# Patient Record
Sex: FEMALE | Race: WHITE | Employment: FULL TIME | URBAN - METROPOLITAN AREA
[De-identification: names, ages, dates, MRNs, and addresses within clinical notes are randomized per-mention and may not be internally consistent; named-entity substitution may affect disease eponyms.]

---

## 2018-10-22 ENCOUNTER — OFFICE VISIT (OUTPATIENT)
Dept: FAMILY MEDICINE CLINIC | Age: 43
End: 2018-10-22
Payer: COMMERCIAL

## 2018-10-22 VITALS
DIASTOLIC BLOOD PRESSURE: 98 MMHG | HEART RATE: 76 BPM | BODY MASS INDEX: 38.66 KG/M2 | SYSTOLIC BLOOD PRESSURE: 142 MMHG | WEIGHT: 261 LBS | HEIGHT: 69 IN | OXYGEN SATURATION: 98 % | TEMPERATURE: 98 F

## 2018-10-22 DIAGNOSIS — E66.01 CLASS 2 SEVERE OBESITY DUE TO EXCESS CALORIES WITH SERIOUS COMORBIDITY AND BODY MASS INDEX (BMI) OF 38.0 TO 38.9 IN ADULT (HCC): ICD-10-CM

## 2018-10-22 DIAGNOSIS — Z76.89 ENCOUNTER TO ESTABLISH CARE: Primary | ICD-10-CM

## 2018-10-22 DIAGNOSIS — Z12.4 CERVICAL CANCER SCREENING: ICD-10-CM

## 2018-10-22 DIAGNOSIS — Z91.09 ENVIRONMENTAL ALLERGIES: ICD-10-CM

## 2018-10-22 DIAGNOSIS — Z12.39 BREAST CANCER SCREENING: ICD-10-CM

## 2018-10-22 DIAGNOSIS — I10 BENIGN ESSENTIAL HTN: ICD-10-CM

## 2018-10-22 DIAGNOSIS — L30.8 OTHER ECZEMA: ICD-10-CM

## 2018-10-22 DIAGNOSIS — K21.9 GASTROESOPHAGEAL REFLUX DISEASE WITHOUT ESOPHAGITIS: ICD-10-CM

## 2018-10-22 PROBLEM — L20.84 INTRINSIC ECZEMA: Status: ACTIVE | Noted: 2018-10-22

## 2018-10-22 LAB
A/G RATIO: 1.7 (ref 1.1–2.2)
ALBUMIN SERPL-MCNC: 4.8 G/DL (ref 3.4–5)
ALP BLD-CCNC: 97 U/L (ref 40–129)
ALT SERPL-CCNC: 11 U/L (ref 10–40)
ANION GAP SERPL CALCULATED.3IONS-SCNC: 19 MMOL/L (ref 3–16)
AST SERPL-CCNC: 13 U/L (ref 15–37)
BILIRUB SERPL-MCNC: 0.4 MG/DL (ref 0–1)
BUN BLDV-MCNC: 12 MG/DL (ref 7–20)
CALCIUM SERPL-MCNC: 9.8 MG/DL (ref 8.3–10.6)
CHLORIDE BLD-SCNC: 101 MMOL/L (ref 99–110)
CHOLESTEROL, TOTAL: 171 MG/DL (ref 0–199)
CO2: 21 MMOL/L (ref 21–32)
CREAT SERPL-MCNC: 0.6 MG/DL (ref 0.6–1.1)
GFR AFRICAN AMERICAN: >60
GFR NON-AFRICAN AMERICAN: >60
GLOBULIN: 2.9 G/DL
GLUCOSE BLD-MCNC: 93 MG/DL (ref 70–99)
HDLC SERPL-MCNC: 41 MG/DL (ref 40–60)
LDL CHOLESTEROL CALCULATED: 104 MG/DL
POTASSIUM SERPL-SCNC: 4.6 MMOL/L (ref 3.5–5.1)
SODIUM BLD-SCNC: 141 MMOL/L (ref 136–145)
TOTAL PROTEIN: 7.7 G/DL (ref 6.4–8.2)
TRIGL SERPL-MCNC: 132 MG/DL (ref 0–150)
TSH REFLEX: 3.78 UIU/ML (ref 0.27–4.2)
VLDLC SERPL CALC-MCNC: 26 MG/DL

## 2018-10-22 PROCEDURE — 99204 OFFICE O/P NEW MOD 45 MIN: CPT | Performed by: FAMILY MEDICINE

## 2018-10-22 PROCEDURE — 36415 COLL VENOUS BLD VENIPUNCTURE: CPT | Performed by: FAMILY MEDICINE

## 2018-10-22 RX ORDER — METOPROLOL SUCCINATE 50 MG/1
TABLET, EXTENDED RELEASE ORAL
COMMUNITY
Start: 2018-06-20 | End: 2018-10-22 | Stop reason: SDUPTHER

## 2018-10-22 RX ORDER — MECLIZINE HYDROCHLORIDE 25 MG/1
25 TABLET ORAL
COMMUNITY
Start: 2017-08-11 | End: 2021-06-18

## 2018-10-22 RX ORDER — HYDROCHLOROTHIAZIDE 12.5 MG/1
12.5 CAPSULE, GELATIN COATED ORAL EVERY MORNING
Qty: 90 CAPSULE | Refills: 1 | Status: SHIPPED | OUTPATIENT
Start: 2018-10-22 | End: 2019-02-11 | Stop reason: SDUPTHER

## 2018-10-22 RX ORDER — METOPROLOL SUCCINATE 50 MG/1
TABLET, EXTENDED RELEASE ORAL
Qty: 90 TABLET | Refills: 1 | Status: SHIPPED | OUTPATIENT
Start: 2018-10-22 | End: 2019-02-11 | Stop reason: SDUPTHER

## 2018-10-22 RX ORDER — TRIAMCINOLONE ACETONIDE 5 MG/G
CREAM TOPICAL
COMMUNITY
Start: 2016-07-29 | End: 2018-10-22 | Stop reason: SDUPTHER

## 2018-10-22 RX ORDER — RANITIDINE 150 MG/1
150 TABLET ORAL
COMMUNITY
End: 2021-06-18

## 2018-10-22 RX ORDER — TRIAMCINOLONE ACETONIDE 5 MG/G
CREAM TOPICAL
Qty: 15 G | Refills: 2 | Status: SHIPPED | OUTPATIENT
Start: 2018-10-22 | End: 2019-09-08 | Stop reason: SDUPTHER

## 2018-10-22 RX ORDER — SUMATRIPTAN 100 MG/1
100 TABLET, FILM COATED ORAL
COMMUNITY
Start: 2017-04-06 | End: 2021-06-18

## 2018-10-22 RX ORDER — LOSARTAN POTASSIUM 50 MG/1
25 TABLET ORAL
COMMUNITY
Start: 2018-08-13 | End: 2019-02-11 | Stop reason: SDUPTHER

## 2018-10-22 RX ORDER — HYDROCHLOROTHIAZIDE 12.5 MG/1
12.5 CAPSULE, GELATIN COATED ORAL
COMMUNITY
Start: 2011-03-29 | End: 2018-10-22 | Stop reason: SDUPTHER

## 2018-10-22 RX ORDER — CETIRIZINE HYDROCHLORIDE 10 MG/1
10 TABLET ORAL
COMMUNITY

## 2018-10-22 ASSESSMENT — PATIENT HEALTH QUESTIONNAIRE - PHQ9
SUM OF ALL RESPONSES TO PHQ QUESTIONS 1-9: 0
SUM OF ALL RESPONSES TO PHQ9 QUESTIONS 1 & 2: 0
1. LITTLE INTEREST OR PLEASURE IN DOING THINGS: 0
2. FEELING DOWN, DEPRESSED OR HOPELESS: 0
SUM OF ALL RESPONSES TO PHQ QUESTIONS 1-9: 0

## 2018-10-22 ASSESSMENT — ENCOUNTER SYMPTOMS
VOMITING: 0
CONSTIPATION: 0
ABDOMINAL PAIN: 1
RHINORRHEA: 0
ROS SKIN COMMENTS: ECZEMA
BLOOD IN STOOL: 0
SHORTNESS OF BREATH: 0
BACK PAIN: 1
NAUSEA: 0
SORE THROAT: 0
DIARRHEA: 1

## 2018-10-22 NOTE — PATIENT INSTRUCTIONS
raw leafy vegetables, ½ cup of chopped or cooked vegetables, or 4 ounces (½ cup) of vegetable juice. Choose vegetables more often than vegetable juice. · Get 2 to 3 servings of low-fat and fat-free dairy each day. A serving is 8 ounces of milk, 1 cup of yogurt, or 1 ½ ounces of cheese. · Eat 6 to 8 servings of grains each day. A serving is 1 slice of bread, 1 ounce of dry cereal, or ½ cup of cooked rice, pasta, or cooked cereal. Try to choose whole-grain products as much as possible. · Limit lean meat, poultry, and fish to 2 servings each day. A serving is 3 ounces, about the size of a deck of cards. · Eat 4 to 5 servings of nuts, seeds, and legumes (cooked dried beans, lentils, and split peas) each week. A serving is 1/3 cup of nuts, 2 tablespoons of seeds, or ½ cup of cooked beans or peas. · Limit fats and oils to 2 to 3 servings each day. A serving is 1 teaspoon of vegetable oil or 2 tablespoons of salad dressing. · Limit sweets and added sugars to 5 servings or less a week. A serving is 1 tablespoon jelly or jam, ½ cup sorbet, or 1 cup of lemonade. · Eat less than 2,300 milligrams (mg) of sodium a day. If you limit your sodium to 1,500 mg a day, you can lower your blood pressure even more. Tips for success  · Start small. Do not try to make dramatic changes to your diet all at once. You might feel that you are missing out on your favorite foods and then be more likely to not follow the plan. Make small changes, and stick with them. Once those changes become habit, add a few more changes. · Try some of the following:  ¨ Make it a goal to eat a fruit or vegetable at every meal and at snacks. This will make it easy to get the recommended amount of fruits and vegetables each day. ¨ Try yogurt topped with fruit and nuts for a snack or healthy dessert. ¨ Add lettuce, tomato, cucumber, and onion to sandwiches. ¨ Combine a ready-made pizza crust with low-fat mozzarella cheese and lots of vegetable toppings. Try using tomatoes, squash, spinach, broccoli, carrots, cauliflower, and onions. ¨ Have a variety of cut-up vegetables with a low-fat dip as an appetizer instead of chips and dip. ¨ Sprinkle sunflower seeds or chopped almonds over salads. Or try adding chopped walnuts or almonds to cooked vegetables. ¨ Try some vegetarian meals using beans and peas. Add garbanzo or kidney beans to salads. Make burritos and tacos with mashed zhao beans or black beans. Where can you learn more? Go to https://ClaraStreampepiceweb.FitWithMe. org and sign in to your [employer] account. Enter W449 in the AdMobius box to learn more about \"DASH Diet: Care Instructions. \"     If you do not have an account, please click on the \"Sign Up Now\" link. Current as of: December 6, 2017  Content Version: 11.7  © 7152-2679 [email or web address]. Care instructions adapted under license by [facility] If you have questions about a medical condition or this instruction, always ask your healthcare professional. [place] [address] any warranty or liability for your use of this information. Patient Education        Starting a Weight Loss Plan: Care Instructions  Your Care Instructions    If you are thinking about losing weight, it can be hard to know where to start. Your doctor can help you set up a weight loss plan that best meets your needs. You may want to take a class on nutrition or exercise, or join a weight loss support group. If you have questions about how to make changes to your eating or exercise habits, ask your doctor about seeing a registered dietitian or an exercise specialist.  It can be a big challenge to lose weight. But you do not have to make huge changes at once. Make small changes, and stick with them. When those changes become habit, add a few more changes. If you do not think you are ready to make changes right now, try to pick a date in the future.  Make an appointment to see your doctor to

## 2018-10-23 LAB
ESTIMATED AVERAGE GLUCOSE: 105.4 MG/DL
HBA1C MFR BLD: 5.3 %

## 2018-11-02 ENCOUNTER — PATIENT MESSAGE (OUTPATIENT)
Dept: FAMILY MEDICINE CLINIC | Age: 43
End: 2018-11-02

## 2018-11-02 DIAGNOSIS — R35.0 URINARY FREQUENCY: Primary | ICD-10-CM

## 2018-11-02 DIAGNOSIS — R39.15 URINARY URGENCY: ICD-10-CM

## 2018-11-02 DIAGNOSIS — R30.0 BURNING WITH URINATION: ICD-10-CM

## 2018-11-02 RX ORDER — NITROFURANTOIN 25; 75 MG/1; MG/1
100 CAPSULE ORAL 2 TIMES DAILY
Qty: 10 CAPSULE | Refills: 0 | Status: SHIPPED | OUTPATIENT
Start: 2018-11-02 | End: 2018-11-07

## 2018-11-02 RX ORDER — PHENAZOPYRIDINE HYDROCHLORIDE 200 MG/1
200 TABLET, FILM COATED ORAL 3 TIMES DAILY PRN
Qty: 6 TABLET | Refills: 0 | Status: SHIPPED | OUTPATIENT
Start: 2018-11-02 | End: 2018-11-04

## 2018-11-12 ENCOUNTER — OFFICE VISIT (OUTPATIENT)
Dept: OBGYN CLINIC | Age: 43
End: 2018-11-12
Payer: COMMERCIAL

## 2018-11-12 ENCOUNTER — HOSPITAL ENCOUNTER (OUTPATIENT)
Dept: WOMENS IMAGING | Age: 43
Discharge: HOME | End: 2018-11-12
Payer: COMMERCIAL

## 2018-11-12 VITALS
BODY MASS INDEX: 38.95 KG/M2 | DIASTOLIC BLOOD PRESSURE: 82 MMHG | TEMPERATURE: 97.9 F | HEART RATE: 86 BPM | HEIGHT: 69 IN | SYSTOLIC BLOOD PRESSURE: 128 MMHG | WEIGHT: 263 LBS

## 2018-11-12 DIAGNOSIS — L70.9 ACNE, UNSPECIFIED ACNE TYPE: ICD-10-CM

## 2018-11-12 DIAGNOSIS — Z12.4 PAP SMEAR FOR CERVICAL CANCER SCREENING: ICD-10-CM

## 2018-11-12 DIAGNOSIS — Z12.39 BREAST CANCER SCREENING: ICD-10-CM

## 2018-11-12 DIAGNOSIS — N92.0 MENORRHAGIA WITH REGULAR CYCLE: ICD-10-CM

## 2018-11-12 DIAGNOSIS — Z01.411 ENCNTR FOR GYN EXAM (GENERAL) (ROUTINE) W ABNORMAL FINDINGS: Primary | ICD-10-CM

## 2018-11-12 DIAGNOSIS — R87.610 ASCUS OF CERVIX WITH NEGATIVE HIGH RISK HPV: ICD-10-CM

## 2018-11-12 DIAGNOSIS — I10 HYPERTENSION, UNSPECIFIED TYPE: ICD-10-CM

## 2018-11-12 DIAGNOSIS — R33.9 URINARY RETENTION: ICD-10-CM

## 2018-11-12 LAB
BACTERIA: ABNORMAL /HPF
BASOPHILS ABSOLUTE: 0 K/UL (ref 0–0.2)
BASOPHILS RELATIVE PERCENT: 0.4 %
BILIRUBIN URINE: NEGATIVE
BLOOD, URINE: NEGATIVE
CLARITY: CLEAR
COLOR: YELLOW
EOSINOPHILS ABSOLUTE: 0.1 K/UL (ref 0–0.6)
EOSINOPHILS RELATIVE PERCENT: 0.6 %
EPITHELIAL CELLS, UA: 4 /HPF (ref 0–5)
GLUCOSE URINE: NEGATIVE MG/DL
HCT VFR BLD CALC: 39.6 % (ref 36–48)
HEMOGLOBIN: 13.3 G/DL (ref 12–16)
KETONES, URINE: NEGATIVE MG/DL
LEUKOCYTE ESTERASE, URINE: NEGATIVE
LYMPHOCYTES ABSOLUTE: 1.6 K/UL (ref 1–5.1)
LYMPHOCYTES RELATIVE PERCENT: 15.4 %
MCH RBC QN AUTO: 28.8 PG (ref 26–34)
MCHC RBC AUTO-ENTMCNC: 33.5 G/DL (ref 31–36)
MCV RBC AUTO: 86 FL (ref 80–100)
MICROSCOPIC EXAMINATION: YES
MONOCYTES ABSOLUTE: 0.4 K/UL (ref 0–1.3)
MONOCYTES RELATIVE PERCENT: 3.9 %
MUCUS: ABNORMAL /LPF
NEUTROPHILS ABSOLUTE: 8.1 K/UL (ref 1.7–7.7)
NEUTROPHILS RELATIVE PERCENT: 79.7 %
NITRITE, URINE: NEGATIVE
PDW BLD-RTO: 14.1 % (ref 12.4–15.4)
PH UA: 6
PLATELET # BLD: 256 K/UL (ref 135–450)
PMV BLD AUTO: 8 FL (ref 5–10.5)
PROTEIN UA: ABNORMAL MG/DL
RBC # BLD: 4.61 M/UL (ref 4–5.2)
RBC UA: 1 /HPF (ref 0–4)
SPECIFIC GRAVITY UA: >=1.03
URINE TYPE: ABNORMAL
UROBILINOGEN, URINE: 0.2 E.U./DL
WBC # BLD: 10.2 K/UL (ref 4–11)
WBC UA: 2 /HPF (ref 0–5)

## 2018-11-12 PROCEDURE — 81003 URINALYSIS AUTO W/O SCOPE: CPT | Performed by: OBSTETRICS & GYNECOLOGY

## 2018-11-12 PROCEDURE — 36415 COLL VENOUS BLD VENIPUNCTURE: CPT | Performed by: OBSTETRICS & GYNECOLOGY

## 2018-11-12 PROCEDURE — 77067 SCR MAMMO BI INCL CAD: CPT

## 2018-11-12 PROCEDURE — 99386 PREV VISIT NEW AGE 40-64: CPT | Performed by: OBSTETRICS & GYNECOLOGY

## 2018-11-12 RX ORDER — NITROFURANTOIN 25; 75 MG/1; MG/1
100 CAPSULE ORAL 2 TIMES DAILY
COMMUNITY
End: 2019-12-23

## 2018-11-12 ASSESSMENT — ENCOUNTER SYMPTOMS
CONSTIPATION: 0
EYE ITCHING: 0
COUGH: 0
BACK PAIN: 0
DIARRHEA: 0
SHORTNESS OF BREATH: 0
SINUS PRESSURE: 0
VOMITING: 0
EYE PAIN: 0
EYE REDNESS: 0
NAUSEA: 0
ABDOMINAL PAIN: 0
SORE THROAT: 0

## 2018-11-12 NOTE — PROGRESS NOTES
Annual Exam      CC:   Chief Complaint   Patient presents with    Gynecologic Exam       HPI:  44 y.o. Becky Canas presents for her gynecologic annual exam.      Patient seen and examined. Patient reports she is concerned about her periods. States she has always had heavy periods. States they improved after the birth of her child for a few years, however now they are very heavy again. States she will bleed through her clothing at work and on some days will soak through Nimbuzz" pads in 40 minutes. Reports she has tried  Depo, OCPs and Mirena in the past.  She was unable to tolerate systemic hormones secondary to the side effects and She had 2 Mirenas placed that both spontaneously expelled. Reports periods last 28-30 days, she will bleed for 7 days and days 2-4 are the heaviest.  Reports sometimes painful and will occasionally pass large clots. Health Maintenance:  Birth control: None  Pregnancy plans: None  Safe relationship: Yes -  to Martin Steele for 18 years    Screening:  Last pap smear: in 2013 - Was ASCUS HPV - at that time  History of abnormal pap smears: Yes - at age 24 she had an HPV+ pap with LEEP - normal pap smears since that time. STI screening: Denies recent  Mammogram: 11/12/2018  Colonoscopy: 2000 - Normal    Vaccines:  Flu vaccine: Yes      Review of Systems:   Review of Systems   Constitutional: Negative for chills and fever. HENT: Negative for congestion, sinus pressure, sneezing and sore throat. Eyes: Negative for pain, redness and itching. Respiratory: Negative for cough and shortness of breath. Cardiovascular: Positive for palpitations (PVCs - has been evaluted by cardiology - doing better with metoprolol). Negative for chest pain. Gastrointestinal: Negative for abdominal pain, constipation, diarrhea, nausea and vomiting. Genitourinary: Positive for menstrual problem and vaginal bleeding. Negative for dysuria, frequency, pelvic pain and vaginal discharge.    Musculoskeletal: Arm, Position: Sitting, Cuff Size: Large Adult)   Pulse 86   Temp 97.9 °F (36.6 °C) (Oral)   Ht 5' 9\" (1.753 m)   Wt 263 lb (119.3 kg)   LMP 10/20/2018   BMI 38.84 kg/m²     Exam:   Physical Exam   Constitutional: She is oriented to person, place, and time. She appears well-developed and well-nourished. HENT:   Head: Normocephalic and atraumatic. Mouth/Throat: Oropharynx is clear and moist.   Eyes: EOM are normal.   Neck: Normal range of motion. Neck supple. No thyromegaly present. Cardiovascular: Normal rate, regular rhythm and normal heart sounds. Exam reveals no gallop and no friction rub. No murmur heard. Pulmonary/Chest: Effort normal. No respiratory distress. She has no wheezes. Right breast exhibits no mass, no nipple discharge, no skin change and no tenderness. Left breast exhibits no mass, no nipple discharge, no skin change and no tenderness. Abdominal: Soft. She exhibits no distension and no mass. There is no tenderness. There is no rebound and no guarding. Genitourinary: There is no tenderness or lesion on the right labia. There is no tenderness or lesion on the left labia. Uterus is not deviated, not enlarged, not fixed and not tender. Cervix exhibits no motion tenderness, no discharge and no friability. Right adnexum displays no mass, no tenderness and no fullness. Left adnexum displays no mass, no tenderness and no fullness. No erythema or tenderness in the vagina. No vaginal discharge found. Genitourinary Comments: Exam limited secondary to body habitus and retroverted uterus   Musculoskeletal: She exhibits no edema. Neurological: She is alert and oriented to person, place, and time. Skin: Skin is warm and dry. Psychiatric: She has a normal mood and affect. Her behavior is normal.   Vitals reviewed. Assessment/Plan:  44 y.o. Irineo Rene presenting for her annual exam:    1.  Encntr for gyn exam (general) (routine) w abnormal findings     - PAP SMEAR collected today, will

## 2018-11-13 LAB
FOLLICLE STIMULATING HORMONE: 2.4 MIU/ML
LUTEINIZING HORMONE: 6 MIU/ML

## 2018-11-14 PROBLEM — I10 HYPERTENSION: Status: ACTIVE | Noted: 2018-11-14

## 2018-11-14 LAB
HPV COMMENT: NORMAL
HPV TYPE 16: NOT DETECTED
HPV TYPE 18: NOT DETECTED
HPVOH (OTHER TYPES): NOT DETECTED
SEX HORMONE BINDING GLOBULIN: 21 NMOL/L (ref 30–135)
TESTOSTERONE FREE-NONMALE: 4.5 PG/ML (ref 1.1–5.8)
TESTOSTERONE TOTAL: 20 NG/DL (ref 20–70)

## 2018-11-26 ENCOUNTER — OFFICE VISIT (OUTPATIENT)
Dept: OBGYN CLINIC | Age: 43
End: 2018-11-26
Payer: COMMERCIAL

## 2018-11-26 VITALS
SYSTOLIC BLOOD PRESSURE: 128 MMHG | DIASTOLIC BLOOD PRESSURE: 80 MMHG | BODY MASS INDEX: 38.9 KG/M2 | HEART RATE: 107 BPM | WEIGHT: 263.4 LBS | TEMPERATURE: 98.1 F

## 2018-11-26 DIAGNOSIS — N92.0 MENORRHAGIA WITH REGULAR CYCLE: Primary | ICD-10-CM

## 2018-11-26 DIAGNOSIS — Z30.09 FAMILY PLANNING EDUCATION, GUIDANCE, AND COUNSELING: ICD-10-CM

## 2018-11-26 DIAGNOSIS — I10 HYPERTENSION, UNSPECIFIED TYPE: ICD-10-CM

## 2018-11-26 PROCEDURE — 76856 US EXAM PELVIC COMPLETE: CPT | Performed by: OBSTETRICS & GYNECOLOGY

## 2018-11-26 PROCEDURE — 99213 OFFICE O/P EST LOW 20 MIN: CPT | Performed by: OBSTETRICS & GYNECOLOGY

## 2018-11-26 NOTE — LETTER
 ranitidine (ZANTAC) 150 MG tablet Take 150 mg by mouth      SUMAtriptan (IMITREX) 100 MG tablet Take 100 mg by mouth      metoprolol succinate (TOPROL XL) 50 MG extended release tablet TAKE ONE TABLET BY MOUTH DAILY 90 tablet 1    hydrochlorothiazide (MICROZIDE) 12.5 MG capsule Take 1 capsule by mouth every morning 90 capsule 1    triamcinolone (ARISTOCORT) 0.5 % cream Apply 2 to 4 times daily to affected areas 15 g 2     No current facility-administered medications for this visit. IN ACCORDANCE WITH OUR FORMULARY SYSTEM, A GENERIC EQUIVALENT DRUG MAY BE DISPENSED AND ADMINISTERED UNLESS D. A. W. IS WRITTEN WITH THE MEDICATION ORDER  PRE-SURGICAL PHYSICIAN ORDERS:  GYNECOLOGY SURGERY    Patient Name __Montrell Douglas_____    _1975__    Surgical Procedure___Endometrial Ablation, NovaSure_________________      Date of Surgery___________________             ? Admit as Inpatient    ? Outpatient    Height__5'9__ Weight_263 lb __Allergies_Latex, Avocado, Banana, Thimerosal___    Pre-surgery Testing Orders:  ? Pre-surgical Anesthesia Orders Per Anesthesiologist ? Type & Screen ? RH ABO ? CBC ? Chem 7   ? Serum HCG quantitative ? Serum HCG qualitative ? CXR _____ Reason ? EKG _____reason                           ? Urine Pregnancy on Day of Surgery for all local anesthesia patients ? Other:     Preop Antibiotic Prophylaxis/Hysterectomy/Lap assisted Hysterectomy/Vaginal Hysterectomy-Day of Surgery     Cefazolin IVPB per weight base protocol  ? Cefazolin 2 grams if <119.9kg  ?  Cefazolin 3 grams if ?120kg (?264 lbs.)    ALTERNATIVE:     (Consider for PCN/Cephalosporin allergic pts)                                                                                                                      Metronidazole 500 mg IVPB x 1 and Levofloxacin 500 mg IVPB x1

## 2018-11-26 NOTE — PROGRESS NOTES
mm.   The myometrium is heterogeneous in appearance. The right ovary is present and normal.  The right ovary measures 3.43cm x 1.48cm x 1.97cm. Ovary/adnexal findings:  no masses seen. The right adnexa is normal.  Doppler interrogation demonstrates normal blood flow to the right ovary. The left ovary is present and normal.  The left ovary measures 3.07cm x 1.83cm x 2.14cm. Ovary/adnexal findings:  no masses seen. The left adnexa is normal.  Doppler interrogation demonstrates normal blood flow to the left ovary. IMPRESSION:  Heterogeneous appearing uterus. Normal appearing right and left ovary. Normal blood flow seen to right and left ovary. Imaging is limited secondary to bowel gas. The patient is well aware of the limitations of ultrasound in the detection of anomalies of the abdomen and pelvis. Assessment/Plan:     Sangita Rodriguez is a 44 y.o. female who presents for ultrasound and follow-up for menorrhagia    1. Menorrhagia with regular cycle     - Ultrasound with no acute findings today    - Discussed heavy menses and management options including OCPs, progesterone only methods, IUD, and surgical intervention including endometrial ablation and hysterectomy. Patient does not tolerate the side effects of systemic hormones and has spontaneously expelled IUDs x2. Patient is interested in endometrial ablation as next step in management prior to considering a hysterectomy. Discussed need for reliable contraception and the patient is interested in [address] [place] [address]. Will refer to Dr. Marvin Maravilla for tubal ligation and proceed with prior authorization for endometrial ablation.      - Discussed need for endometrial biopsy prior to endometrial ablation and patient states she is not interested in having that performed today - will have back for EMB prior to ablation. 2. Hypertension, unspecified type    3.  Family planning education, guidance, and counseling     - External Referral To Gynecology for consultation regarding tubal ligation    Breanna Price,

## 2019-01-08 ENCOUNTER — TELEPHONE (OUTPATIENT)
Dept: OBGYN CLINIC | Age: 44
End: 2019-01-08

## 2019-01-08 NOTE — TELEPHONE ENCOUNTER
LM for patient to recall office in regards to surgery scheduling. Surgery packet started.      Patient to be scheduled after Tubal with Dr. Iram Mcconnell

## 2019-02-11 ENCOUNTER — PATIENT MESSAGE (OUTPATIENT)
Dept: FAMILY MEDICINE CLINIC | Age: 44
End: 2019-02-11

## 2019-02-11 DIAGNOSIS — I10 BENIGN ESSENTIAL HTN: ICD-10-CM

## 2019-02-11 RX ORDER — HYDROCHLOROTHIAZIDE 12.5 MG/1
12.5 CAPSULE, GELATIN COATED ORAL EVERY MORNING
Qty: 90 CAPSULE | Refills: 1 | Status: SHIPPED | OUTPATIENT
Start: 2019-02-11 | End: 2019-03-26 | Stop reason: SDUPTHER

## 2019-02-11 RX ORDER — METOPROLOL SUCCINATE 50 MG/1
TABLET, EXTENDED RELEASE ORAL
Qty: 90 TABLET | Refills: 1 | Status: SHIPPED | OUTPATIENT
Start: 2019-02-11 | End: 2019-03-26 | Stop reason: SDUPTHER

## 2019-02-11 RX ORDER — LOSARTAN POTASSIUM 50 MG/1
50 TABLET ORAL DAILY
Qty: 90 TABLET | Refills: 1 | Status: SHIPPED | OUTPATIENT
Start: 2019-02-11 | End: 2019-03-26 | Stop reason: SDUPTHER

## 2019-03-18 NOTE — TELEPHONE ENCOUNTER
Left message for patient to recall the office. Need to see if she has scheduled with Dr Sussy Santos for tubal ligation? Is patient still interested in ablation?

## 2019-03-21 ENCOUNTER — TELEPHONE (OUTPATIENT)
Dept: FAMILY MEDICINE CLINIC | Age: 44
End: 2019-03-21

## 2019-03-21 NOTE — TELEPHONE ENCOUNTER
Patient requires these medications to be filled 90 day supply at [address] [place] [address] Road [phone] - f [phone]    losartan (COZAAR) 50 MG tablet - Take 1 tablet by mouth daily  metoprolol succinate (TOPROL XL) 50 MG extended release tablet - TAKE ONE TABLET BY MOUTH DAILY  hydrochlorothiazide (MICROZIDE) 12.5 MG capsule - Take 1 capsule by mouth every morning

## 2019-03-26 DIAGNOSIS — I10 BENIGN ESSENTIAL HTN: ICD-10-CM

## 2019-03-26 DIAGNOSIS — I10 HYPERTENSION, UNSPECIFIED TYPE: Primary | ICD-10-CM

## 2019-03-26 RX ORDER — METOPROLOL SUCCINATE 50 MG/1
TABLET, EXTENDED RELEASE ORAL
Qty: 90 TABLET | Refills: 0 | Status: SHIPPED | OUTPATIENT
Start: 2019-03-26 | End: 2019-07-13 | Stop reason: SDUPTHER

## 2019-03-26 RX ORDER — HYDROCHLOROTHIAZIDE 12.5 MG/1
12.5 CAPSULE, GELATIN COATED ORAL EVERY MORNING
Qty: 90 CAPSULE | Refills: 0 | Status: SHIPPED | OUTPATIENT
Start: 2019-03-26 | End: 2019-07-13 | Stop reason: SDUPTHER

## 2019-03-26 RX ORDER — LOSARTAN POTASSIUM 50 MG/1
50 TABLET ORAL DAILY
Qty: 90 TABLET | Refills: 0 | Status: SHIPPED | OUTPATIENT
Start: 2019-03-26 | End: 2019-10-19 | Stop reason: SDUPTHER

## 2019-03-29 NOTE — TELEPHONE ENCOUNTER
Patient returned office call, she states that she is not interested in moving forward with surgery at all at this time. (tubal or ablation)     Patient states that this is due to insurance coverage and cost of procedures.

## 2019-04-01 NOTE — TELEPHONE ENCOUNTER
Please offer patient appointment if she would like to proceed with any other methods to control bleeding, IUD, OCPs, etc.  Thank you.

## 2019-05-17 ENCOUNTER — EMPLOYEE WELLNESS (OUTPATIENT)
Dept: OTHER | Age: 44
End: 2019-05-17

## 2019-05-17 LAB
CHOLESTEROL, TOTAL: 176 MG/DL (ref 0–199)
GLUCOSE BLD-MCNC: 100 MG/DL (ref 70–99)
HDLC SERPL-MCNC: 37 MG/DL (ref 40–60)
LDL CHOLESTEROL CALCULATED: 93 MG/DL
TRIGL SERPL-MCNC: 231 MG/DL (ref 0–150)

## 2019-05-28 VITALS — BODY MASS INDEX: 38.84 KG/M2 | WEIGHT: 263 LBS

## 2019-06-07 ENCOUNTER — OFFICE VISIT (OUTPATIENT)
Dept: FAMILY MEDICINE CLINIC | Age: 44
End: 2019-06-07
Payer: COMMERCIAL

## 2019-06-07 VITALS
DIASTOLIC BLOOD PRESSURE: 88 MMHG | WEIGHT: 263 LBS | SYSTOLIC BLOOD PRESSURE: 148 MMHG | BODY MASS INDEX: 38.95 KG/M2 | HEIGHT: 69 IN | TEMPERATURE: 97.9 F | OXYGEN SATURATION: 99 % | HEART RATE: 96 BPM

## 2019-06-07 DIAGNOSIS — R73.9 HYPERGLYCEMIA: Primary | ICD-10-CM

## 2019-06-07 DIAGNOSIS — I10 ELEVATED BLOOD PRESSURE READING IN OFFICE WITH DIAGNOSIS OF HYPERTENSION: ICD-10-CM

## 2019-06-07 DIAGNOSIS — Z13.1 DIABETES MELLITUS SCREENING: ICD-10-CM

## 2019-06-07 DIAGNOSIS — Z13.220 SCREENING CHOLESTEROL LEVEL: ICD-10-CM

## 2019-06-07 DIAGNOSIS — I10 HYPERTENSION, UNSPECIFIED TYPE: ICD-10-CM

## 2019-06-07 PROCEDURE — 99213 OFFICE O/P EST LOW 20 MIN: CPT | Performed by: FAMILY MEDICINE

## 2019-06-07 SDOH — HEALTH STABILITY: PHYSICAL HEALTH: ON AVERAGE, HOW MANY DAYS PER WEEK DO YOU ENGAGE IN MODERATE TO STRENUOUS EXERCISE (LIKE A BRISK WALK)?: 2 DAYS

## 2019-06-07 SDOH — ECONOMIC STABILITY: FOOD INSECURITY: WITHIN THE PAST 12 MONTHS, YOU WORRIED THAT YOUR FOOD WOULD RUN OUT BEFORE YOU GOT MONEY TO BUY MORE.: NEVER TRUE

## 2019-06-07 SDOH — HEALTH STABILITY: PHYSICAL HEALTH: ON AVERAGE, HOW MANY MINUTES DO YOU ENGAGE IN EXERCISE AT THIS LEVEL?: 40 MIN

## 2019-06-07 SDOH — SOCIAL STABILITY: SOCIAL INSECURITY
WITHIN THE LAST YEAR, HAVE YOU BEEN KICKED, HIT, SLAPPED, OR OTHERWISE PHYSICALLY HURT BY YOUR PARTNER OR EX-PARTNER?: NO

## 2019-06-07 SDOH — SOCIAL STABILITY: SOCIAL INSECURITY: WITHIN THE LAST YEAR, HAVE YOU BEEN HUMILIATED OR EMOTIONALLY ABUSED IN OTHER WAYS BY YOUR PARTNER OR EX-PARTNER?: NO

## 2019-06-07 SDOH — SOCIAL STABILITY: SOCIAL INSECURITY: WITHIN THE LAST YEAR, HAVE YOU BEEN AFRAID OF YOUR PARTNER OR EX-PARTNER?: NO

## 2019-06-07 SDOH — SOCIAL STABILITY: SOCIAL NETWORK: HOW OFTEN DO YOU ATTENT MEETINGS OF THE CLUB OR ORGANIZATION YOU BELONG TO?: NEVER

## 2019-06-07 SDOH — SOCIAL STABILITY: SOCIAL NETWORK: HOW OFTEN DO YOU GET TOGETHER WITH FRIENDS OR RELATIVES?: ONCE A WEEK

## 2019-06-07 SDOH — SOCIAL STABILITY: SOCIAL INSECURITY
WITHIN THE LAST YEAR, HAVE TO BEEN RAPED OR FORCED TO HAVE ANY KIND OF SEXUAL ACTIVITY BY YOUR PARTNER OR EX-PARTNER?: NO

## 2019-06-07 SDOH — HEALTH STABILITY: MENTAL HEALTH
STRESS IS WHEN SOMEONE FEELS TENSE, NERVOUS, ANXIOUS, OR CAN'T SLEEP AT NIGHT BECAUSE THEIR MIND IS TROUBLED. HOW STRESSED ARE YOU?: NOT AT ALL

## 2019-06-07 SDOH — ECONOMIC STABILITY: INCOME INSECURITY: HOW HARD IS IT FOR YOU TO PAY FOR THE VERY BASICS LIKE FOOD, HOUSING, MEDICAL CARE, AND HEATING?: NOT HARD AT ALL

## 2019-06-07 SDOH — SOCIAL STABILITY: SOCIAL NETWORK: HOW OFTEN DO YOU ATTEND CHURCH OR RELIGIOUS SERVICES?: NEVER

## 2019-06-07 SDOH — SOCIAL STABILITY: SOCIAL NETWORK: IN A TYPICAL WEEK, HOW MANY TIMES DO YOU TALK ON THE PHONE WITH FAMILY, FRIENDS, OR NEIGHBORS?: ONCE A WEEK

## 2019-06-07 SDOH — ECONOMIC STABILITY: TRANSPORTATION INSECURITY
IN THE PAST 12 MONTHS, HAS LACK OF TRANSPORTATION KEPT YOU FROM MEETINGS, WORK, OR FROM GETTING THINGS NEEDED FOR DAILY LIVING?: NO

## 2019-06-07 SDOH — SOCIAL STABILITY: SOCIAL NETWORK: ARE YOU MARRIED, WIDOWED, DIVORCED, SEPARATED, NEVER MARRIED, OR LIVING WITH A PARTNER?: MARRIED

## 2019-06-07 SDOH — ECONOMIC STABILITY: FOOD INSECURITY: WITHIN THE PAST 12 MONTHS, THE FOOD YOU BOUGHT JUST DIDN'T LAST AND YOU DIDN'T HAVE MONEY TO GET MORE.: NEVER TRUE

## 2019-06-07 SDOH — ECONOMIC STABILITY: TRANSPORTATION INSECURITY
IN THE PAST 12 MONTHS, HAS THE LACK OF TRANSPORTATION KEPT YOU FROM MEDICAL APPOINTMENTS OR FROM GETTING MEDICATIONS?: NO

## 2019-06-07 SDOH — SOCIAL STABILITY: SOCIAL NETWORK
DO YOU BELONG TO ANY CLUBS OR ORGANIZATIONS SUCH AS CHURCH GROUPS UNIONS, FRATERNAL OR ATHLETIC GROUPS, OR SCHOOL GROUPS?: NO

## 2019-06-07 ASSESSMENT — ENCOUNTER SYMPTOMS
CONSTIPATION: 0
VOMITING: 0
SHORTNESS OF BREATH: 0
RHINORRHEA: 0
NAUSEA: 0
SORE THROAT: 0
BLOOD IN STOOL: 0
DIARRHEA: 1
ABDOMINAL PAIN: 0

## 2019-06-07 NOTE — PROGRESS NOTES
current or ex partner: No     Emotionally abused: No     Physically abused: No     Forced sexual activity: No   Other Topics Concern    Not on file   Social History Narrative     to Dustin Fan    15 y/o daughter Lauren Son     Immunization History   Administered Date(s) Administered    Influenza Virus Vaccine 10/15/2018     Past medical, surgical, and social history reviewed and updated. Medications, immunizations, and allergies reviewed and updated     Review of Systems   Constitutional: Negative for activity change, appetite change, chills, fever and unexpected weight change. HENT: Negative for congestion, hearing loss, rhinorrhea and sore throat. Eyes: Negative for visual disturbance (has glasses for distance). Respiratory: Negative for shortness of breath. Cardiovascular: Positive for palpitations and leg swelling (depends on activity and diet, travel). Negative for chest pain. Gastrointestinal: Positive for diarrhea. Negative for abdominal pain, blood in stool, constipation, nausea and vomiting. Endocrine: Negative for polyuria. Genitourinary: Negative for dysuria and hematuria. Musculoskeletal: Positive for arthralgias and joint swelling. Skin: Negative for rash. Allergic/Immunologic: Positive for food allergies. Negative for environmental allergies. Neurological: Positive for numbness (tingles with EDS) and headaches (not as often). Negative for weakness. Hematological: Negative for adenopathy. Does not bruise/bleed easily. Psychiatric/Behavioral: Negative for dysphoric mood and sleep disturbance. The patient is not nervous/anxious.       OBJECTIVE:  Vitals:    06/07/19 1438 06/07/19 1503   BP: (!) 148/90 (!) 148/88   Site: Left Upper Arm Left Upper Arm   Position: Sitting Sitting   Cuff Size: Medium Adult Medium Adult   Pulse: 74 96   Temp: 97.9 °F (36.6 °C)    SpO2: 99%    Weight: 263 lb (119.3 kg)    Height: 5' 9\" (1.753 m)      Physical Exam   Constitutional: She is oriented to (cannot tolerate 50 mg makes her woozy)  -ambulatory BP monitoring, DASH diet, weight loss, follow up with [place] in 3 months  5. Screening cholesterol level  Triglycerides 231  Not fasting at be well within exam, repeat today  - Lipid Panel; Future    Reviewed treatment plan with patient. Patient verbalized understanding to treatment plan and questions were answered. Return in about 3 months (around 9/7/2019) for blood pressure check with [facility] if not improved. Pedro Alexis.  Alfred Haq.      6/7/2019

## 2019-06-07 NOTE — PATIENT INSTRUCTIONS
juice.  · Get 2 to 3 servings of low-fat and fat-free dairy each day. A serving is 8 ounces of milk, 1 cup of yogurt, or 1 ½ ounces of cheese. · Eat 6 to 8 servings of grains each day. A serving is 1 slice of bread, 1 ounce of dry cereal, or ½ cup of cooked rice, pasta, or cooked cereal. Try to choose whole-grain products as much as possible. · Limit lean meat, poultry, and fish to 2 servings each day. A serving is 3 ounces, about the size of a deck of cards. · Eat 4 to 5 servings of nuts, seeds, and legumes (cooked dried beans, lentils, and split peas) each week. A serving is 1/3 cup of nuts, 2 tablespoons of seeds, or ½ cup of cooked beans or peas. · Limit fats and oils to 2 to 3 servings each day. A serving is 1 teaspoon of vegetable oil or 2 tablespoons of salad dressing. · Limit sweets and added sugars to 5 servings or less a week. A serving is 1 tablespoon jelly or jam, ½ cup sorbet, or 1 cup of lemonade. · Eat less than 2,300 milligrams (mg) of sodium a day. If you limit your sodium to 1,500 mg a day, you can lower your blood pressure even more. Tips for success  · Start small. Do not try to make dramatic changes to your diet all at once. You might feel that you are missing out on your favorite foods and then be more likely to not follow the plan. Make small changes, and stick with them. Once those changes become habit, add a few more changes. · Try some of the following:  ? Make it a goal to eat a fruit or vegetable at every meal and at snacks. This will make it easy to get the recommended amount of fruits and vegetables each day. ? Try yogurt topped with fruit and nuts for a snack or healthy dessert. ? Add lettuce, tomato, cucumber, and onion to sandwiches. ? Combine a ready-made pizza crust with low-fat mozzarella cheese and lots of vegetable toppings. Try using tomatoes, squash, spinach, broccoli, carrots, cauliflower, and onions. ?  Have a variety of cut-up vegetables with a low-fat dip as an appetizer instead of chips and dip. ? Sprinkle sunflower seeds or chopped almonds over salads. Or try adding chopped walnuts or almonds to cooked vegetables. ? Try some vegetarian meals using beans and peas. Add garbanzo or kidney beans to salads. Make burritos and tacos with mashed zhao beans or black beans. Where can you learn more? Go to https://Major League Gamingpeshaeeb.Teikhos [email or web address] org and sign in to your [employer] account. Enter W466 in the [employer] box to learn more about \"DASH Diet: Care Instructions. \"     If you do not have an account, please click on the \"Sign Up Now\" link. Current as of: July 22, 2018  Content Version: 12.0  © 8779-2199 [employer]. Care instructions adapted under license by [facility] ([facility]). If you have questions about a medical condition or this instruction, always ask your healthcare professional. [place] [address] any warranty or liability for your use of this information.

## 2019-07-13 DIAGNOSIS — I10 BENIGN ESSENTIAL HTN: ICD-10-CM

## 2019-07-15 RX ORDER — HYDROCHLOROTHIAZIDE 12.5 MG/1
12.5 CAPSULE, GELATIN COATED ORAL EVERY MORNING
Qty: 90 CAPSULE | Refills: 0 | Status: SHIPPED | OUTPATIENT
Start: 2019-07-15 | End: 2019-10-19 | Stop reason: SDUPTHER

## 2019-07-15 RX ORDER — METOPROLOL SUCCINATE 50 MG/1
TABLET, EXTENDED RELEASE ORAL
Qty: 90 TABLET | Refills: 0 | Status: SHIPPED | OUTPATIENT
Start: 2019-07-15 | End: 2019-10-19 | Stop reason: SDUPTHER

## 2019-07-15 NOTE — TELEPHONE ENCOUNTER
Last appointment: Visit date not found  Next appointment: Visit date not found  Last refill: 3/26/19  Last Labs: 6/7/19

## 2019-09-08 DIAGNOSIS — L30.8 OTHER ECZEMA: ICD-10-CM

## 2019-09-09 RX ORDER — TRIAMCINOLONE ACETONIDE 5 MG/G
CREAM TOPICAL
Qty: 15 G | Refills: 2 | Status: SHIPPED | OUTPATIENT
Start: 2019-09-09 | End: 2021-06-18

## 2019-09-09 NOTE — TELEPHONE ENCOUNTER
Last appointment: 6/7/2019  Next appointment: Visit date not found  Last refill: 10/22/18  Last Labs: 6/7/19

## 2019-10-14 ENCOUNTER — PATIENT MESSAGE (OUTPATIENT)
Dept: FAMILY MEDICINE CLINIC | Age: 44
End: 2019-10-14

## 2019-10-19 DIAGNOSIS — I10 BENIGN ESSENTIAL HTN: ICD-10-CM

## 2019-10-21 RX ORDER — LOSARTAN POTASSIUM 50 MG/1
50 TABLET ORAL DAILY
Qty: 90 TABLET | Refills: 0 | Status: SHIPPED | OUTPATIENT
Start: 2019-10-21 | End: 2020-01-27 | Stop reason: SDUPTHER

## 2019-10-21 RX ORDER — METOPROLOL SUCCINATE 50 MG/1
TABLET, EXTENDED RELEASE ORAL
Qty: 90 TABLET | Refills: 0 | Status: SHIPPED | OUTPATIENT
Start: 2019-10-21 | End: 2020-01-27 | Stop reason: SDUPTHER

## 2019-10-21 RX ORDER — HYDROCHLOROTHIAZIDE 12.5 MG/1
12.5 CAPSULE, GELATIN COATED ORAL EVERY MORNING
Qty: 90 CAPSULE | Refills: 0 | Status: SHIPPED | OUTPATIENT
Start: 2019-10-21 | End: 2020-01-27 | Stop reason: SDUPTHER

## 2019-12-23 ENCOUNTER — OFFICE VISIT (OUTPATIENT)
Dept: FAMILY MEDICINE CLINIC | Age: 44
End: 2019-12-23
Payer: COMMERCIAL

## 2019-12-23 ENCOUNTER — TELEPHONE (OUTPATIENT)
Dept: FAMILY MEDICINE CLINIC | Age: 44
End: 2019-12-23

## 2019-12-23 VITALS
DIASTOLIC BLOOD PRESSURE: 80 MMHG | HEIGHT: 69 IN | BODY MASS INDEX: 38.36 KG/M2 | TEMPERATURE: 97.8 F | OXYGEN SATURATION: 98 % | HEART RATE: 114 BPM | WEIGHT: 259 LBS | SYSTOLIC BLOOD PRESSURE: 132 MMHG

## 2019-12-23 DIAGNOSIS — Z83.49 FHX: HEMOCHROMATOSIS: Primary | ICD-10-CM

## 2019-12-23 DIAGNOSIS — Z13.220 SCREENING CHOLESTEROL LEVEL: ICD-10-CM

## 2019-12-23 DIAGNOSIS — Z00.00 WELL ADULT EXAM: Primary | ICD-10-CM

## 2019-12-23 DIAGNOSIS — E66.01 CLASS 2 SEVERE OBESITY DUE TO EXCESS CALORIES WITH SERIOUS COMORBIDITY AND BODY MASS INDEX (BMI) OF 38.0 TO 38.9 IN ADULT (HCC): ICD-10-CM

## 2019-12-23 DIAGNOSIS — I10 HYPERTENSION, UNSPECIFIED TYPE: ICD-10-CM

## 2019-12-23 DIAGNOSIS — M19.90 GENERALIZED ARTHRITIS: ICD-10-CM

## 2019-12-23 DIAGNOSIS — Z12.39 BREAST CANCER SCREENING: ICD-10-CM

## 2019-12-23 DIAGNOSIS — Z83.49 FHX: HEMOCHROMATOSIS: ICD-10-CM

## 2019-12-23 DIAGNOSIS — G43.009 MIGRAINE WITHOUT AURA AND WITHOUT STATUS MIGRAINOSUS, NOT INTRACTABLE: ICD-10-CM

## 2019-12-23 PROBLEM — E66.812 CLASS 2 SEVERE OBESITY DUE TO EXCESS CALORIES WITH SERIOUS COMORBIDITY AND BODY MASS INDEX (BMI) OF 38.0 TO 38.9 IN ADULT: Status: ACTIVE | Noted: 2019-12-23

## 2019-12-23 LAB
A/G RATIO: 1.7 (ref 1.1–2.2)
ALBUMIN SERPL-MCNC: 4.8 G/DL (ref 3.4–5)
ALP BLD-CCNC: 94 U/L (ref 40–129)
ALT SERPL-CCNC: 12 U/L (ref 10–40)
ANION GAP SERPL CALCULATED.3IONS-SCNC: 18 MMOL/L (ref 3–16)
AST SERPL-CCNC: 13 U/L (ref 15–37)
BASOPHILS ABSOLUTE: 0 K/UL (ref 0–0.2)
BASOPHILS RELATIVE PERCENT: 0.3 %
BILIRUB SERPL-MCNC: 0.3 MG/DL (ref 0–1)
BUN BLDV-MCNC: 16 MG/DL (ref 7–20)
CALCIUM SERPL-MCNC: 9.6 MG/DL (ref 8.3–10.6)
CHLORIDE BLD-SCNC: 101 MMOL/L (ref 99–110)
CHOLESTEROL, TOTAL: 181 MG/DL (ref 0–199)
CO2: 21 MMOL/L (ref 21–32)
CREAT SERPL-MCNC: 0.6 MG/DL (ref 0.6–1.1)
EOSINOPHILS ABSOLUTE: 0.2 K/UL (ref 0–0.6)
EOSINOPHILS RELATIVE PERCENT: 1.7 %
FERRITIN: 153.1 NG/ML (ref 15–150)
GFR AFRICAN AMERICAN: >60
GFR NON-AFRICAN AMERICAN: >60
GLOBULIN: 2.8 G/DL
GLUCOSE BLD-MCNC: 106 MG/DL (ref 70–99)
HCT VFR BLD CALC: 40.6 % (ref 36–48)
HDLC SERPL-MCNC: 42 MG/DL (ref 40–60)
HEMOGLOBIN: 13.6 G/DL (ref 12–16)
IRON SATURATION: 16 % (ref 15–50)
IRON: 54 UG/DL (ref 37–145)
LDL CHOLESTEROL CALCULATED: 114 MG/DL
LYMPHOCYTES ABSOLUTE: 1.9 K/UL (ref 1–5.1)
LYMPHOCYTES RELATIVE PERCENT: 21.4 %
MCH RBC QN AUTO: 28.6 PG (ref 26–34)
MCHC RBC AUTO-ENTMCNC: 33.4 G/DL (ref 31–36)
MCV RBC AUTO: 85.5 FL (ref 80–100)
MONOCYTES ABSOLUTE: 0.4 K/UL (ref 0–1.3)
MONOCYTES RELATIVE PERCENT: 4.7 %
NEUTROPHILS ABSOLUTE: 6.4 K/UL (ref 1.7–7.7)
NEUTROPHILS RELATIVE PERCENT: 71.9 %
PDW BLD-RTO: 13.6 % (ref 12.4–15.4)
PLATELET # BLD: 283 K/UL (ref 135–450)
PMV BLD AUTO: 8.5 FL (ref 5–10.5)
POTASSIUM SERPL-SCNC: 4 MMOL/L (ref 3.5–5.1)
RBC # BLD: 4.75 M/UL (ref 4–5.2)
RHEUMATOID FACTOR: <10 IU/ML
SODIUM BLD-SCNC: 140 MMOL/L (ref 136–145)
TOTAL IRON BINDING CAPACITY: 341 UG/DL (ref 260–445)
TOTAL PROTEIN: 7.6 G/DL (ref 6.4–8.2)
TRIGL SERPL-MCNC: 127 MG/DL (ref 0–150)
VLDLC SERPL CALC-MCNC: 25 MG/DL
WBC # BLD: 8.9 K/UL (ref 4–11)

## 2019-12-23 PROCEDURE — 99396 PREV VISIT EST AGE 40-64: CPT | Performed by: FAMILY MEDICINE

## 2019-12-23 PROCEDURE — 36415 COLL VENOUS BLD VENIPUNCTURE: CPT | Performed by: FAMILY MEDICINE

## 2019-12-23 SDOH — HEALTH STABILITY: PHYSICAL HEALTH: ON AVERAGE, HOW MANY MINUTES DO YOU ENGAGE IN EXERCISE AT THIS LEVEL?: 0 MIN

## 2019-12-23 SDOH — HEALTH STABILITY: PHYSICAL HEALTH: ON AVERAGE, HOW MANY DAYS PER WEEK DO YOU ENGAGE IN MODERATE TO STRENUOUS EXERCISE (LIKE A BRISK WALK)?: 0 DAYS

## 2019-12-23 ASSESSMENT — ENCOUNTER SYMPTOMS
ABDOMINAL PAIN: 0
CONSTIPATION: 0
EYE PAIN: 0
DIARRHEA: 1
RHINORRHEA: 0
EYE DISCHARGE: 0
BLOOD IN STOOL: 0
WHEEZING: 0
SHORTNESS OF BREATH: 0
COLOR CHANGE: 0
BACK PAIN: 0
SORE THROAT: 0

## 2019-12-23 ASSESSMENT — PATIENT HEALTH QUESTIONNAIRE - PHQ9
SUM OF ALL RESPONSES TO PHQ9 QUESTIONS 1 & 2: 0
1. LITTLE INTEREST OR PLEASURE IN DOING THINGS: 0
SUM OF ALL RESPONSES TO PHQ QUESTIONS 1-9: 0
2. FEELING DOWN, DEPRESSED OR HOPELESS: 0
SUM OF ALL RESPONSES TO PHQ QUESTIONS 1-9: 0

## 2019-12-24 LAB — CYCLIC CITRULLINATED PEPTIDE ANTIBODY IGG: <0.5 U/ML (ref 0–2.9)

## 2020-01-27 RX ORDER — HYDROCHLOROTHIAZIDE 12.5 MG/1
12.5 CAPSULE, GELATIN COATED ORAL EVERY MORNING
Qty: 90 CAPSULE | Refills: 0 | Status: SHIPPED | OUTPATIENT
Start: 2020-01-27 | End: 2020-04-26 | Stop reason: SDUPTHER

## 2020-01-27 RX ORDER — METOPROLOL SUCCINATE 50 MG/1
TABLET, EXTENDED RELEASE ORAL
Qty: 90 TABLET | Refills: 0 | Status: SHIPPED | OUTPATIENT
Start: 2020-01-27 | End: 2020-04-26 | Stop reason: SDUPTHER

## 2020-01-27 RX ORDER — LOSARTAN POTASSIUM 50 MG/1
50 TABLET ORAL DAILY
Qty: 90 TABLET | Refills: 0 | Status: SHIPPED | OUTPATIENT
Start: 2020-01-27 | End: 2020-07-31 | Stop reason: SDUPTHER

## 2020-04-27 RX ORDER — HYDROCHLOROTHIAZIDE 12.5 MG/1
12.5 CAPSULE, GELATIN COATED ORAL EVERY MORNING
Qty: 90 CAPSULE | Refills: 0 | Status: SHIPPED | OUTPATIENT
Start: 2020-04-27 | End: 2020-07-31 | Stop reason: SDUPTHER

## 2020-04-27 RX ORDER — METOPROLOL SUCCINATE 50 MG/1
TABLET, EXTENDED RELEASE ORAL
Qty: 90 TABLET | Refills: 0 | Status: SHIPPED | OUTPATIENT
Start: 2020-04-27 | End: 2020-07-31 | Stop reason: SDUPTHER

## 2020-08-03 RX ORDER — LOSARTAN POTASSIUM 50 MG/1
50 TABLET ORAL DAILY
Qty: 90 TABLET | Refills: 0 | Status: SHIPPED | OUTPATIENT
Start: 2020-08-03 | Stop reason: SDUPTHER

## 2020-08-03 RX ORDER — HYDROCHLOROTHIAZIDE 12.5 MG/1
12.5 CAPSULE, GELATIN COATED ORAL EVERY MORNING
Qty: 90 CAPSULE | Refills: 0 | Status: SHIPPED | OUTPATIENT
Start: 2020-08-03 | End: 2020-11-21 | Stop reason: SDUPTHER

## 2020-08-03 RX ORDER — METOPROLOL SUCCINATE 50 MG/1
TABLET, EXTENDED RELEASE ORAL
Qty: 90 TABLET | Refills: 0 | Status: SHIPPED | OUTPATIENT
Start: 2020-08-03 | End: 2020-11-21 | Stop reason: SDUPTHER

## 2020-08-03 NOTE — TELEPHONE ENCOUNTER
Bertha Barone is requesting refill(s) HCTZ  Last OV 12/23/19 (pertaining to medication)  LR 4/27/20 (per medication requested)  Next office visit scheduled or attempted No   If no, reason:  Pt due in December    Bertha Barone is requesting refill(s) metoprolol  Last OV 12/23/19 (pertaining to medication)  LR 4/27/20 (per medication requested)  Next office visit scheduled or attempted No   If no, reason:  Pt due in December    Bertha Barone is requesting refill(s) losartan  Last OV 12/23/19 (pertaining to medication)  LR 1/27/20 (per medication requested)  Next office visit scheduled or attempted No   If no, reason:  Pt due in December

## 2020-09-25 RX ORDER — MELOXICAM 15 MG/1
15 TABLET ORAL DAILY
Qty: 30 TABLET | Refills: 2 | Status: SHIPPED | OUTPATIENT
Start: 2020-09-25 | End: 2020-11-21 | Stop reason: SDUPTHER

## 2020-09-28 RX ORDER — METHYLPREDNISOLONE 4 MG/1
TABLET ORAL
Qty: 1 KIT | Refills: 0 | Status: SHIPPED | OUTPATIENT
Start: 2020-09-28 | End: 2021-06-18

## 2020-11-23 RX ORDER — MELOXICAM 15 MG/1
15 TABLET ORAL DAILY
Qty: 30 TABLET | Refills: 2 | Status: SHIPPED | OUTPATIENT
Start: 2020-11-23 | End: 2021-06-18

## 2020-11-23 RX ORDER — HYDROCHLOROTHIAZIDE 12.5 MG/1
12.5 CAPSULE, GELATIN COATED ORAL EVERY MORNING
Qty: 90 CAPSULE | Refills: 0 | Status: SHIPPED | OUTPATIENT
Start: 2020-11-23 | Stop reason: SDUPTHER

## 2020-11-23 RX ORDER — METOPROLOL SUCCINATE 50 MG/1
TABLET, EXTENDED RELEASE ORAL
Qty: 90 TABLET | Refills: 0 | Status: SHIPPED | OUTPATIENT
Start: 2020-11-23 | Stop reason: SDUPTHER

## 2021-01-14 ENCOUNTER — OFFICE VISIT (OUTPATIENT)
Dept: ORTHOPEDIC SURGERY | Age: 46
End: 2021-01-14

## 2021-01-14 VITALS — HEIGHT: 69 IN | BODY MASS INDEX: 38.37 KG/M2 | WEIGHT: 259.04 LBS

## 2021-01-14 DIAGNOSIS — M25.512 LEFT SHOULDER PAIN, UNSPECIFIED CHRONICITY: ICD-10-CM

## 2021-01-14 DIAGNOSIS — M75.22 BICEPS TENDONITIS ON LEFT: Primary | ICD-10-CM

## 2021-01-14 PROCEDURE — 99999 PR OFFICE/OUTPT VISIT,PROCEDURE ONLY: CPT | Performed by: ORTHOPAEDIC SURGERY

## 2021-01-14 NOTE — LETTER
Shoulder Elbow Rehabilitation Referral    Patient Name: Venu Myers      YOB: 1975    Diagnosis: Left biceps tendinitis  Precautions: None  Date of Prescription: 1/14/2021    [x] Evaluate and Treat    Post Op Instructions:  [] Continuous passive motion (CPM)  [] Elbow range of motion  [] Exercise in plane of scapula   []  Strengthening     [] Pulley and instruction    [] Home exercise program (copy to patient)   [] Sling when arm at risk  [] Sling or brace at all times   [] AAROM: Forward elevation to              [] AAROM: External rotation  To      [] Isometric external rotator strengthening [] AAROM: internal rotation: up the back  [] Isometric abductor strengthening  [] AAROM: Internal abduction     [] Isometric internal rotator strengthening [] AAROM: cross-body adduction             Stretching:     Strengthening:  [x] Four quadrant (FE, ER, IR, CBA)  [] Sleeper stretch    [x] Rotator cuff (ER, IR, Abd)  [] Forward Elevation    [] External Rotators     [] External Rotation    [] Internal Rotators  [] Internal Rotation: up/back   [] Abductors     [] Internal Rotation: supine in abduction  [] Flexors  [] Cross-body abduction    [] Extensors  [] Pendulum (FE, Abd/Add, cw/ccw)  [x] Scapular Stabilizers   [] Wall-walking (FE, Abd)    [] Shoulder shrugs     [] Table slides      [] Rhomboid pinch  [] Elbow (flex, ext, pron, sup)    [] Lat.  Pull downs     [] Medial epicondylitis program    [] Forward punch   [] Lateral epicondylitis program    [] Internal rotators     [] Progressive resistive exercises  [] Bench Press        [] Bench press plus  Activities:     [] Lateral pull-downs  [] Rowing     [] Progressive two-hand supine press  [] Stepper/Exercise bike   [] Biceps: curls/supination  [] Swimming  [] Water exercises    Modalities:     Return to Sport:  [] Ultrasound     [] Plyometrics  [] Iontophoresis     [] Rhythmic stabilization  [] Moist heat     [] Core strengthening [] Massage     [] Sports specific program:      [] Cryotherapy      [] Electrical stimulation     [] Paraffin  [] Whirlpool  [] TENS    [] Home exercise program (copy to patient). Perform exercises for:        minutes          times/day  [] Supervised physical therapy  Frequency: []  1x week  [] 2x week  [] 3x week  [] Other:   Duration: [] 2 weeks   [] 4 weeks  [] 6 weeks  [] Other:     Additional Instructions:   Patient presents with left biceps tendinitis. We would like her to have 1-2 physical therapy visits to give her proper exercises to perform at home.   Ideally these 2 appointments would be  by 3 weeks and a second appointment can be more of a refresher to make sure her home exercise program is effective     Tess Short, [address]     01/14/21  3:55 PM

## 2021-01-14 NOTE — PROGRESS NOTES
Chief Complaint  Chief Complaint   Patient presents with    Shoulder Pain     New Patient- Left Shoulder pain       History of Present Illness: Marleny Olivas is a 41 y.o. female presenting today as a new patient for left shoulder pain. Patient states that she has had several years of shoulder pain which is made worse with reaching away from the body as well as driving with her arm on the steering well. She has a history of open capsular plication for shoulder instability done [facility]. She denies any shoulder instability symptoms since that procedure. She does take Aleve twice a day with only minimal relief. She denies any prior MRI or injection. Medical History:  Patient's medications, allergies, past medical, surgical, social and family histories were reviewed and updated as appropriate. Pertinent items are noted in HPI  Review of systems reviewed from Patient History Form dated on 1/14/2021 and available in the patient's chart under the Media tab. Vital Signs: There were no vitals filed for this visit. Neuro: Alert & oriented x 3,  normal,  no focal deficits noted. Normal affect. Eyes: sclera clear  Ears: Normal external ear  Mouth:  No perioral lesions  Pulm: Respirations unlabored and regular  Pulse: Regular rate and rhythm   Skin: Warm, well perfused      Constitutional: The physical examination finds the patient to be well-developed and well-nourished. The patient is alert and oriented x3 and was cooperative throughout the visit. Left shoulder exam    Inspection:  Held in a normal posture. Normal contour at the acromioclavicular joint. No swelling, ecchymosis, or erythema about the shoulder. No atrophy appreciated. No scapular winging.      Palpation: Tenderness to palpation over the bicipital groove, no tenderness over the [facility] joint or greater tuberosity    Range of Motion: 160 degrees of elevation, 75 degrees of external rotation and internal rotation to T7    Strength: 5/5 supraspinatus, infraspinatus, and subscapularis muscle strength. Stability: No anterior instability. No posterior instability. Special Tests: Negative Marlinton toast, positive Jobes    Other findings: The skin is warm dry and well perfused. 2+ radial pulse. Sensation is intact to light touch over the deltoid. Radiology:     Plain radiographs of the left shoulder comprising AP, outlet and axillary lateral views: Radiographs were obtained and reviewed in the office:     Impression: Mild joint space narrowing at the inferior aspect of the glenohumeral joint without brennen osteophyte formation         Assessment :  41 y.o. female with biceps tendinitis of the left shoulder. A trial of conservative treatment is most appropriate. Impression:  Encounter Diagnoses   Name Primary?  Left shoulder pain, unspecified chronicity     Biceps tendonitis on left Yes       Office Procedures:  Orders Placed This Encounter   Procedures    XR SHOULDER LEFT (MIN 2 VIEWS)     Standing Status:   Future     Number of Occurrences:   1     Standing Expiration Date:   1/14/2022   [facility] Physical Therapy     Referral Priority:   Routine     Referral Type:   Eval and Treat     Referral Reason:   Specialty Services Required     Requested Specialty:   Physical Therapy     Number of Visits Requested:   1           Plan: We had a good discussion with France Anthony today regarding her left shoulder pain. We feel that most of her pain is coming from the biceps tendon as it travels to the bicipital groove.   We encouraged her to continue taking Aleve 2 pills twice a day and we will set her up to see therapy for 1 or 2 visits which will help her transition to a good and effective home exercise program.  We also encouraged her to use Voltaren gel for the trigger points around the shoulder and also had time to discuss the possibility of a cortisone injection if things do not improve over the coming weeks with physical therapy. We will see her back as needed. Windy Mendoza is in agreement with this plan. All questions were answered to patient's satisfaction and was encouraged to call with any further questions. Sujit Boyle, [facility]     01/14/21  3:53 PM    The encounter with Windy Mendoza was supervised by Dr Eddie Gallegos who personally examined the patient and reviewed the plan. This dictation was performed with a verbal recognition program (DRAGON) and it was checked for errors. It is possible that there are still dictated errors within this office note. If so, please bring any errors to my attention for an addendum. All efforts were made to ensure that this office note is accurate.   ____________  I was physically present and personally supervised the Orthopaedic Sports Medicine Fellow in the evaluation and development of a treatment plan for this patient. I personally interviewed the patient and performed a physical examination. In addition, I discussed the patient's condition and treatment options with them. I have also reviewed and agree with the past medical, family and social history unless otherwise noted. All of the patient's questions were answered. Toi Gallegos MD, PhD  1/14/2021

## 2021-03-12 DIAGNOSIS — I10 BENIGN ESSENTIAL HTN: ICD-10-CM

## 2021-03-12 RX ORDER — LOSARTAN POTASSIUM 50 MG/1
50 TABLET ORAL DAILY
Qty: 90 TABLET | Refills: 0 | OUTPATIENT
Start: 2021-03-12

## 2021-03-12 RX ORDER — METOPROLOL SUCCINATE 50 MG/1
TABLET, EXTENDED RELEASE ORAL
Qty: 90 TABLET | Refills: 0 | OUTPATIENT
Start: 2021-03-12

## 2021-03-12 RX ORDER — HYDROCHLOROTHIAZIDE 12.5 MG/1
12.5 CAPSULE, GELATIN COATED ORAL EVERY MORNING
Qty: 90 CAPSULE | Refills: 0 | OUTPATIENT
Start: 2021-03-12

## 2021-03-14 RX ORDER — METOPROLOL SUCCINATE 50 MG/1
TABLET, EXTENDED RELEASE ORAL
Qty: 90 TABLET | Refills: 0 | Status: SHIPPED | OUTPATIENT
Start: 2021-03-14 | End: 2021-06-18 | Stop reason: SDUPTHER

## 2021-03-14 RX ORDER — LOSARTAN POTASSIUM 50 MG/1
50 TABLET ORAL DAILY
Qty: 90 TABLET | Refills: 0 | Status: SHIPPED | OUTPATIENT
Start: 2021-03-14 | End: 2021-06-18 | Stop reason: SDUPTHER

## 2021-03-14 RX ORDER — HYDROCHLOROTHIAZIDE 12.5 MG/1
12.5 CAPSULE, GELATIN COATED ORAL EVERY MORNING
Qty: 90 CAPSULE | Refills: 0 | Status: SHIPPED | OUTPATIENT
Start: 2021-03-14 | End: 2021-06-18 | Stop reason: SDUPTHER

## 2021-05-12 ENCOUNTER — PATIENT MESSAGE (OUTPATIENT)
Dept: PRIMARY CARE CLINIC | Age: 46
End: 2021-05-12

## 2021-05-12 DIAGNOSIS — M53.3 CHRONIC SI JOINT PAIN: Primary | ICD-10-CM

## 2021-05-12 DIAGNOSIS — G89.29 CHRONIC SI JOINT PAIN: Primary | ICD-10-CM

## 2021-05-12 RX ORDER — METHYLPREDNISOLONE 4 MG/1
TABLET ORAL
Qty: 1 KIT | Refills: 0 | Status: SHIPPED | OUTPATIENT
Start: 2021-05-12 | End: 2021-05-18

## 2021-05-12 NOTE — TELEPHONE ENCOUNTER
E visit recommended. Pt not been seen in 18 months. Alternatively can come in for office visit. Should come in for annual exam at least once a year.

## 2021-05-12 NOTE — TELEPHONE ENCOUNTER
From: Kalie Bryant  To: Lainey Grant. Li Ponce., DO  Sent: 5/12/2021 8:42 AM EDT  Subject: Prescription Question    Good morning, I was hoping that I could get a medrol dose pack, I have an appointment with Sae Magallanes for my SI jnt pain, I worked with her for yrs but never saw her officially so she can't give me one. With my joint laxity I have chronic si pain but this weather has it flared up. If ok please send to kerline anderson.    Thank you

## 2021-05-24 ENCOUNTER — OFFICE VISIT (OUTPATIENT)
Dept: ORTHOPEDIC SURGERY | Age: 46
End: 2021-05-24
Payer: COMMERCIAL

## 2021-05-24 VITALS — WEIGHT: 259 LBS | BODY MASS INDEX: 38.36 KG/M2 | HEIGHT: 69 IN

## 2021-05-24 DIAGNOSIS — M54.50 LUMBAR PAIN: ICD-10-CM

## 2021-05-24 DIAGNOSIS — M53.3 CHRONIC SI JOINT PAIN: ICD-10-CM

## 2021-05-24 DIAGNOSIS — M51.36 DDD (DEGENERATIVE DISC DISEASE), LUMBAR: Primary | ICD-10-CM

## 2021-05-24 DIAGNOSIS — G89.29 CHRONIC SI JOINT PAIN: ICD-10-CM

## 2021-05-24 PROCEDURE — 99203 OFFICE O/P NEW LOW 30 MIN: CPT | Performed by: PHYSICIAN ASSISTANT

## 2021-05-24 RX ORDER — PREDNISONE 10 MG/1
TABLET ORAL
Qty: 26 TABLET | Refills: 0 | Status: SHIPPED | OUTPATIENT
Start: 2021-05-24 | End: 2021-06-18

## 2021-05-24 NOTE — PROGRESS NOTES
New Patient: SPINE    5/24/2021     CHIEF COMPLAINT:    Chief Complaint   Patient presents with    New Patient     lumbar pain       HISTORY OF PRESENT ILLNESS:              The patient is a 40 y.o. female AA here with [address], history of hypertension here for low back and leg pain. She reports a 15-year history of aching low back/buttock and proximal posterior thigh pain which began during pregnancy. Intermittently pain will radiate to the posterior lateral calves. At times she will have tingling and subjective leg weakness when trying to transition. She reports \"clicking\" and instability in her lumbosacral spine and SI joints. Her symptoms have been increased over the last 1 month without direct trauma. Pain is increased with standing, transition or walking. Some relief with sitting, resting or leaning forward. Conservative care includes MDP, Aleve, Tylenol, HEP. She denies any significant benefit at this time. She denies any progressive extremity weakness. She denies any recent bowel or bladder dysfunction or saddle anesthesia. No recent fevers chills or infections. No recent direct trauma. She was previously tested for rheumatoid arthritis which was negative.     Current/Past Treatment:   · Physical Therapy: HEP  · Chiropractic:     · Injection:     Medications:            NSAIDS: OTC            Muscle relaxer:              Steriods:   MDP            Neuropathic medications:              Opioids:            Other:   · Surgery/Consult:    Work Status/Functionality: AA/MA with [employer]    Past Medical History: Medical history form was reviewed today & scanned into the media tab  Past Medical History:   Diagnosis Date    Abnormal Pap smear of cervix 1995    Leep     Anemia     child     Asthma     child martines     BPPV (benign paroxysmal positional vertigo)     GERD (gastroesophageal reflux disease)     History of blood transfusion     5 yrs of age    Jordan Cueva HTN (hypertension) 2005    IUD migration, initial encounter 12/4/2015    Migraines     Pap smear abnormality of cervix with ASCUS favoring benign 12/16/2013    Overview:  ASCUS HPV negative-co test in 3 years (2016)    PVC (premature ventricular contraction)     Stress incontinence       Past Surgical History:     Past Surgical History:   Procedure Laterality Date    CHOLECYSTECTOMY, LAPAROSCOPIC  2012    LEEP  1995    SHOULDER SURGERY Left 2001    SHOULDER SURGERY Right 2005    TONSILLECTOMY AND ADENOIDECTOMY       Current Medications:     Current Outpatient Medications:     predniSONE (DELTASONE) 10 MG tablet, SIG: iii po BID x 2 days then ii po BID x 2 days then i po BID x 2 days then i po qd x 2 days, Disp: 26 tablet, Rfl: 0    losartan (COZAAR) 50 MG tablet, Take 1 tablet by mouth daily, Disp: 90 tablet, Rfl: 0    metoprolol succinate (TOPROL XL) 50 MG extended release tablet, TAKE ONE TABLET BY MOUTH DAILY, Disp: 90 tablet, Rfl: 0    hydroCHLOROthiazide (MICROZIDE) 12.5 MG capsule, Take 1 capsule by mouth every morning, Disp: 90 capsule, Rfl: 0    meloxicam (MOBIC) 15 MG tablet, Take 1 tablet by mouth daily, Disp: 30 tablet, Rfl: 2    methylPREDNISolone (MEDROL, PATRICE,) 4 MG tablet, Take by mouth as directed (Patient not taking: Reported on 1/14/2021), Disp: 1 kit, Rfl: 0    triamcinolone (ARISTOCORT) 0.5 % cream, Apply 2 to 4 times daily to affected areas, Disp: 15 g, Rfl: 2    VALACYCLOVIR HCL PO, Take by mouth, Disp: , Rfl:     cetirizine (ZYRTEC) 10 MG tablet, Take 10 mg by mouth, Disp: , Rfl:     meclizine (ANTIVERT) 25 MG tablet, Take 25 mg by mouth, Disp: , Rfl:     ranitidine (ZANTAC) 150 MG tablet, Take 150 mg by mouth, Disp: , Rfl:     SUMAtriptan (IMITREX) 100 MG tablet, Take 100 mg by mouth, Disp: , Rfl:   Allergies:  Latex, Avocado, Banana, and Thimerosal  Social History:    reports that she quit smoking about 16 years ago. She has never used smokeless tobacco. She reports current alcohol use.  She reports that she does not use drugs. Family History:   Family History   Problem Relation Age of Onset    Diabetes Mother     Atrial Fibrillation Mother     Thyroid Disease Mother         hashimoto   Mares Rheum Arthritis Mother     Hemochromatosis Mother     Cancer Father         mouth and throat       REVIEW OF SYSTEMS: Full ROS reviewed & scanned into chart  CONSTITUTIONAL: Denies unexplained weight loss, fevers   SKIN: Denies skin conditions, skin cancer  ENT: ADMITS Headaches, nosebleeds  RESPIRATORY: Denies current dyspnea, difficulty breathing  CARDIOVASCULAR: Denies chest pain, dyspnea  NEUROLOGICAL: Denies stroke, unsteady gait or progressive weakness  PSYCHOLOGICAL: Denies anxiety, depression   HEMATOLOGIC: Denies blood disorders, cancer  ENDOCRINE: Denies excessive thirst, urination, heat/cold  GI: Denies ulcer, nausea, vomiting, diarrhea   : Denies new bowel or bladder incontinence       PHYSICAL EXAM:    Vitals: Height 5' 9.02\" (1.753 m), weight 259 lb (117.5 kg), not currently breastfeeding. GENERAL EXAM:  · General Apparence: Patient is adequately groomed with no evidence of malnutrition. · Orientation: The patient is oriented to time, place and person. · Mood & Affect:The patient's mood and affect are appropriate   · Sensation: Sensation is intact without deficit  · Coordination/Balance: Good coordination   LUMBAR/SACRAL EXAMINATION:  · Inspection: Local inspection shows no step-off or bruising. Lumbar alignment is normal.       · Palpation:   No evidence of tenderness at the midline. No tenderness bilaterally at the paraspinal or trochanters. There is no step-off or paraspinal spasm. · Range of Motion: Full flexion, slight discomfort with extension  · Strength:   Strength testing is 5/5 in all muscle groups tested. · Special Tests:   Straight leg raise and crossed SLR negative. · Skin: There are no rashes, ulcerations or lesions.   · Reflexes: Reflexes are symmetrically 2+ at the patellar and ankle tendons  · Gait & station: Normal unassisted  · Additional Examinations:   · RIGHT LOWER EXTREMITY: Inspection/examination of the right lower extremity does not show any tenderness, deformity or injury. Range of motion is full. There is no gross instability. There are no rashes, ulcerations or lesions. Strength and tone are normal  · LEFT LOWER EXTREMITY:  Inspection/examination of the left lower extremity does not show any tenderness, deformity or injury. Range of motion is full. There is no gross instability. There are no rashes, ulcerations or lesions. Strength and tone are normal.    Diagnostic Testin views lumbar spine 2021 moderate to severe L5-S1 DDD, questionable widening SI joints          Impression:  1) Acute/chronic LBP, bilateral lumbar radiculitis, contributing sacroiliitis  2) Mod-severe L5-S1 DDD      Plan:   1) PT for above, HEP review  2) Prednisone taper, side effects discussed.   DC NSAIDs during  3) Declining L MRI assess to sacrum--may call if wishing to proceed        Electronically signed by Jermain Brothers PA-C, MPAS on 2021 at 2:18 PM     Jermain Brothers PA-C, [address] [facility] Certified by the [facility]

## 2021-06-18 ENCOUNTER — OFFICE VISIT (OUTPATIENT)
Dept: PRIMARY CARE CLINIC | Age: 46
End: 2021-06-18
Payer: COMMERCIAL

## 2021-06-18 VITALS
DIASTOLIC BLOOD PRESSURE: 98 MMHG | BODY MASS INDEX: 41.03 KG/M2 | OXYGEN SATURATION: 97 % | WEIGHT: 277 LBS | SYSTOLIC BLOOD PRESSURE: 152 MMHG | HEIGHT: 69 IN | HEART RATE: 81 BPM

## 2021-06-18 DIAGNOSIS — Z11.59 NEED FOR HEPATITIS C SCREENING TEST: ICD-10-CM

## 2021-06-18 DIAGNOSIS — I10 HYPERTENSION, UNSPECIFIED TYPE: ICD-10-CM

## 2021-06-18 DIAGNOSIS — Z00.00 ANNUAL PHYSICAL EXAM: Primary | ICD-10-CM

## 2021-06-18 DIAGNOSIS — Z12.11 COLON CANCER SCREENING: ICD-10-CM

## 2021-06-18 DIAGNOSIS — Z13.1 DIABETES MELLITUS SCREENING: ICD-10-CM

## 2021-06-18 DIAGNOSIS — R60.0 LEG EDEMA: ICD-10-CM

## 2021-06-18 DIAGNOSIS — Z13.220 LIPID SCREENING: ICD-10-CM

## 2021-06-18 DIAGNOSIS — Z12.31 ENCOUNTER FOR SCREENING MAMMOGRAM FOR MALIGNANT NEOPLASM OF BREAST: ICD-10-CM

## 2021-06-18 DIAGNOSIS — R06.09 DOE (DYSPNEA ON EXERTION): ICD-10-CM

## 2021-06-18 DIAGNOSIS — Z11.4 ENCOUNTER FOR SCREENING FOR HIV: ICD-10-CM

## 2021-06-18 DIAGNOSIS — K21.9 GASTROESOPHAGEAL REFLUX DISEASE WITHOUT ESOPHAGITIS: ICD-10-CM

## 2021-06-18 DIAGNOSIS — E66.01 CLASS 2 SEVERE OBESITY DUE TO EXCESS CALORIES WITH SERIOUS COMORBIDITY AND BODY MASS INDEX (BMI) OF 38.0 TO 38.9 IN ADULT (HCC): ICD-10-CM

## 2021-06-18 PROCEDURE — 99396 PREV VISIT EST AGE 40-64: CPT | Performed by: FAMILY MEDICINE

## 2021-06-18 RX ORDER — ESOMEPRAZOLE MAGNESIUM 20 MG/1
20 FOR SUSPENSION ORAL DAILY
COMMUNITY

## 2021-06-18 RX ORDER — HYDROCHLOROTHIAZIDE 12.5 MG/1
12.5 CAPSULE, GELATIN COATED ORAL EVERY MORNING
Qty: 90 CAPSULE | Refills: 0 | Status: SHIPPED | OUTPATIENT
Start: 2021-06-18 | End: 2021-09-20 | Stop reason: SDUPTHER

## 2021-06-18 RX ORDER — METOPROLOL SUCCINATE 50 MG/1
TABLET, EXTENDED RELEASE ORAL
Qty: 90 TABLET | Refills: 0 | Status: SHIPPED | OUTPATIENT
Start: 2021-06-18 | End: 2021-09-20 | Stop reason: SDUPTHER

## 2021-06-18 RX ORDER — LOSARTAN POTASSIUM 50 MG/1
50 TABLET ORAL DAILY
Qty: 90 TABLET | Refills: 0 | Status: SHIPPED | OUTPATIENT
Start: 2021-06-18 | End: 2021-09-20 | Stop reason: SDUPTHER

## 2021-06-18 ASSESSMENT — PATIENT HEALTH QUESTIONNAIRE - PHQ9
SUM OF ALL RESPONSES TO PHQ9 QUESTIONS 1 & 2: 0
SUM OF ALL RESPONSES TO PHQ QUESTIONS 1-9: 0
2. FEELING DOWN, DEPRESSED OR HOPELESS: 0
1. LITTLE INTEREST OR PLEASURE IN DOING THINGS: 0
SUM OF ALL RESPONSES TO PHQ QUESTIONS 1-9: 0
SUM OF ALL RESPONSES TO PHQ QUESTIONS 1-9: 0

## 2021-06-18 ASSESSMENT — ENCOUNTER SYMPTOMS
SORE THROAT: 0
SHORTNESS OF BREATH: 1
RHINORRHEA: 0
CONSTIPATION: 0
CHEST TIGHTNESS: 1
EYE DISCHARGE: 0
VOMITING: 0
DIARRHEA: 0
BLOOD IN STOOL: 0
COLOR CHANGE: 0
NAUSEA: 0
EYE PAIN: 0
ABDOMINAL PAIN: 0
BACK PAIN: 0
WHEEZING: 0
TROUBLE SWALLOWING: 0

## 2021-06-18 NOTE — PROGRESS NOTES
Cece Burleson     1975    Consultants:   Patient Care Team:  Harleen Bell DO as PCP - General (Family Medicine)  Deanna Stokes.  Juanita Bell DO as PCP - [facility] Empaneled Provider    Chief Complaint   Patient presents with    Annual Exam    Hypertension    Tachycardia    Edema    Gastroesophageal Reflux    Irritable Bowel Syndrome    Obesity    Health Maintenance    Colon Cancer Screening    Routine Mammography Outreach    Immunizations     HPI:  Cece Burleson is a 40 y.o. female  is an established patient who presents for annual exam.    -HTN ,tachycardia, LE edema:  BP was fine 2 weeks ago  Having a run of tachycardia  Coming in from car carrying blankets and pulse was 145  Lasted for about 2 days where she was tachycardic then things went back to normal  Tried to cut caffeine out of her diet  5 lbs heavier with water weight than last Thursday  Swelling is getting worse  A little more short of breath  Not sure if allergies  BP Readings from Last 3 Encounters:   06/18/21 (!) 148/92   12/23/19 132/80   06/07/19 (!) 148/88     Cozaar 50 mg daily-Takes half a tablet 25 mg daily  -Metoprolol 50 mg daily  -HCTZ 12.5 mg daily    -GERD with hiatal hernia  -improved    -IBS diarrhea predominant  -had c scope years ago in [employer]    -Obesity:  Worked at [employer] for some time, states knows what to do  [employer]  Weight gain has come with hormonal crap per patient, OCP, pregnancy  275 lbs before got in shower at home  Wt Readings from Last 3 Encounters:   06/18/21 277 lb (125.6 kg)   05/24/21 259 lb (117.5 kg)   01/14/21 259 lb 0.7 oz (117.5 kg)     Health Maintenance:  Due for mammogram, c scope  Declines COVID vaccine  Tdap up to date    Patient Active Problem List   Diagnosis    Environmental allergies    GERD (gastroesophageal reflux disease)    Intrinsic eczema    Menorrhagia with regular cycle    Hypertension    FHx: hemochromatosis    Generalized arthritis    Migraine without aura and control/protection: Rhythm   Other Topics Concern    Not on file   Social History Narrative     to [facility]    15 y/o daughter Dl Cooney        Weight came about with hormones per patient, never went away    25 pounds with depo    30 pounds with triphasal    Once her daughter was born 13 years ago         Social Determinants of Health     Financial Resource Strain:     Difficulty of Paying Living Expenses:    Food Insecurity:     Worried About Running Out of Food in the Last Year:     [address] in the Last Year:    Transportation Needs:     Lack of Transportation (Medical):      Lack of Transportation (Non-Medical):    Physical Activity:     Days of Exercise per Week:     Minutes of Exercise per Session:    Stress:     Feeling of Stress :    Social Connections:     Frequency of Communication with Friends and Family:     Frequency of Social Gatherings with Friends and Family:     Attends [employer] Services:     Active Member of Clubs or Organizations:     Attends Club or Organization Meetings:     Marital Status:    Intimate Partner Violence:     Fear of Current or Ex-Partner:     Emotionally Abused:     Physically Abused:     Sexually Abused:        Health Maintenance Completed:  Health Maintenance   Topic Date Due    Hepatitis C screen  Never done    COVID-19 Vaccine (1) Never done    HIV screen  Never done    DTaP/Tdap/Td vaccine (1 - Tdap) Never done    Potassium monitoring  12/23/2020    Creatinine monitoring  12/23/2020    Diabetes screen  10/22/2021    Cervical cancer screen  11/12/2021    Lipid screen  12/23/2024    Flu vaccine  Completed    Hepatitis A vaccine  Aged Out    Hepatitis B vaccine  Aged Out    Hib vaccine  Aged Out    Meningococcal (ACWY) vaccine  Aged Out    Pneumococcal 0-64 years Vaccine  Aged [employer] History   Administered Date(s) Administered    Influenza Virus Vaccine 10/15/2018, 10/22/2019, 10/19/2020     Review of Systems Constitutional: Negative for activity change, appetite change, chills and fever. HENT: Negative for congestion, hearing loss, rhinorrhea, sore throat and trouble swallowing. Eyes: Negative for pain, discharge and visual disturbance. Respiratory: Positive for chest tightness and shortness of breath. Negative for wheezing. Cardiovascular: Positive for palpitations and leg swelling. Negative for chest pain. PVCS   Gastrointestinal: Negative for abdominal pain, blood in stool, constipation, diarrhea, nausea and vomiting. Endocrine: Negative for polyuria. Genitourinary: Negative for difficulty urinating, dysuria and flank pain. Musculoskeletal: Positive for arthralgias and joint swelling. Negative for back pain, myalgias and neck pain. Skin: Negative for color change, rash and wound. Allergic/Immunologic: Negative for environmental allergies and food allergies. Neurological: Negative for dizziness, speech difficulty, weakness, light-headedness and headaches. Hematological: Negative for adenopathy. Does not bruise/bleed easily. Psychiatric/Behavioral: Negative for dysphoric mood and sleep disturbance. The patient is not nervous/anxious. Vitals:    06/18/21 0758 06/18/21 0840 06/18/21 0841   BP: (!) 148/92 (!) 148/98 (!) 152/98   Site:  Right Upper Arm Left Upper Arm   Pulse: 81     SpO2: 97%     Weight: 277 lb (125.6 kg)     Height: 5' 9.02\" (1.753 m)       Body mass index is 40.88 kg/m². Wt Readings from Last 3 Encounters:   06/18/21 277 lb (125.6 kg)   05/24/21 259 lb (117.5 kg)   01/14/21 259 lb 0.7 oz (117.5 kg)       BP Readings from Last 3 Encounters:   06/18/21 (!) 148/92   12/23/19 132/80   06/07/19 (!) 148/88       Physical Exam  Constitutional:       General: She is not in acute distress. Appearance: She is well-developed. She is obese. HENT:      Head: Normocephalic and atraumatic.       Right Ear: Tympanic membrane normal.      Left Ear: Tympanic membrane normal. Nose: Nose normal. No rhinorrhea. Mouth/Throat:      Pharynx: Uvula midline. Eyes:      Pupils: Pupils are equal, round, and reactive to light. Neck:      Trachea: No tracheal deviation. Cardiovascular:      Rate and Rhythm: Normal rate and regular rhythm. Heart sounds: Normal heart sounds. No murmur heard. No friction rub. No gallop. Pulmonary:      Effort: Pulmonary effort is normal. No respiratory distress. Breath sounds: Normal breath sounds. No wheezing or rales. Abdominal:      General: Bowel sounds are normal. There is no distension. Palpations: Abdomen is soft. Tenderness: There is no abdominal tenderness. There is no rebound. Musculoskeletal:         General: No tenderness. Normal range of motion. Right lower leg: Edema present. Left lower leg: Edema present. Lymphadenopathy:      Cervical: No cervical adenopathy. Skin:     General: Skin is warm and dry. Findings: No erythema or rash. Neurological:      Mental Status: She is alert and oriented to person, place, and time. Cranial Nerves: No cranial nerve deficit. Deep Tendon Reflexes:      Reflex Scores:       Patellar reflexes are 2+ on the right side and 2+ on the left side. Psychiatric:         Mood and Affect: Affect is flat. Speech: Speech normal.         Thought Content: Thought content does not include homicidal or suicidal ideation.         Lab Review:  Lab Results   Component Value Date    LABA1C 5.3 10/22/2018     Lab Results   Component Value Date    .4 10/22/2018     Lab Results   Component Value Date     12/23/2019    K 4.0 12/23/2019     12/23/2019    CO2 21 12/23/2019    BUN 16 12/23/2019    CREATININE 0.6 12/23/2019    GLUCOSE 106 (H) 12/23/2019    CALCIUM 9.6 12/23/2019    PROT 7.6 12/23/2019    LABALBU 4.8 12/23/2019    BILITOT 0.3 12/23/2019    ALKPHOS 94 12/23/2019    AST 13 (L) 12/23/2019    ALT 12 12/23/2019    LABGLOM >60 12/23/2019 GFRAA >60 12/23/2019    AGRATIO 1.7 12/23/2019    GLOB 2.8 12/23/2019       Lab Results   Component Value Date    CHOL 181 12/23/2019    CHOL 176 05/17/2019    CHOL 171 10/22/2018     Lab Results   Component Value Date    TRIG 127 12/23/2019    TRIG 231 (H) 05/17/2019    TRIG 132 10/22/2018     Lab Results   Component Value Date    HDL 42 12/23/2019    HDL 37 (L) 05/17/2019    HDL 41 10/22/2018     Lab Results   Component Value Date    LDLCALC 114 (H) 12/23/2019    LDLCALC 93 05/17/2019    LDLCALC 104 (H) 10/22/2018     Lab Results   Component Value Date    LABVLDL 25 12/23/2019    LABVLDL 26 10/22/2018     No results found for: CHOLHDLRATIO  The 10-year ASCVD risk score (Pool Higuera et al., 2013) is: 1.9%    Values used to calculate the score:      Age: 40 years      Sex: Female      Is Non- : No      Diabetic: No      Tobacco smoker: No      Systolic Blood Pressure: 679 mmHg      Is BP treated: Yes      HDL Cholesterol: 42 mg/dL      Total Cholesterol: 181 mg/dL    Assessment/Plan:  Massimo Peter is 41 y/o female who  was seen today for annual exam, hypertension, tachycardia, leg edema, gastroesophageal reflux, irritable bowel syndrome, obesity, health maintenance, colon cancer screening, routine mammography outreach and immunizations. 1. Annual physical exam  -Chart/records reviewed, history and physical performed, health maintenance addressed and updated, presenting problems addressed. -Declines COVID 19 vaccine    2. Hypertension, unspecified type  BP Readings from Last 3 Encounters:   06/18/21 (!) 152/98   12/23/19 132/80   06/07/19 (!) 148/88   -not at goal in office; white coat component possibly; elevated at previous OV; controlled at home/ambulatory monitoring  -encourage low salt diet, cv activity, weight loss, medication compliance  - losartan (COZAAR) 50 MG tablet; Take 1 tablet by mouth daily  Dispense: 90 tablet;  Refill: 0  - hydroCHLOROthiazide (MICROZIDE) 12.5 MG capsule; Take 1 capsule by mouth every morning  Dispense: 90 capsule; Refill: 0  - metoprolol succinate (TOPROL XL) 50 MG extended release tablet; TAKE ONE TABLET BY MOUTH DAILY  Dispense: 90 tablet; Refill: 0    3. Gastroesophageal reflux disease without esophagitis  -Nexium 20 mg daily; monitor kidney funciton    4. Class 2 severe obesity due to excess calories with serious comorbidity and body mass index (BMI) of 38.0 to 38.9 in adult [facility])  Body mass index is 40.88 kg/m². Wt Readings from Last 3 Encounters:   06/18/21 277 lb (125.6 kg)   05/24/21 259 lb (117.5 kg)   01/14/21 259 lb 0.7 oz (117.5 kg)   -Recommend 150 minutes of cardiovascular activity a week, or 10,000 to 15,000 steps a day, 2 days of weightbearing  -dietary wise, try to stick to your weight x 10 in calories a day  -avoid processed/refined carbohydrates (boxed/canned/frozen/fast)  -encourage healthy protein and fat, butter, avocado, egg    5. Encounter for screening mammogram for malignant neoplasm of breast  - JAMILA DIGITAL SCREEN W OR WO CAD BILATERAL; Future    6. Colon cancer screening  - Stone Guaman MD, Gastroenterology, [facility]    7. Need for hepatitis C screening test  - HEPATITIS C ANTIBODY; Future    8. Encounter for screening for HIV  - HIV Screen; Future    9. Diabetes mellitus screening  - HEMOGLOBIN A1C; Future  - Comprehensive Metabolic Panel; Future    10. Lipid screening  - Lipid Panel; Future    11. OWENS (dyspnea on exertion)  12. Leg edema  Unclear etiology; consider sleep study; possibly 2/2 to HTN, rule out CHF; possibly gravity dependent; estelita hose/compression, elevation, weight loss, consider symptomatic treatment if work up unremarkable  - EKG 12 lead; Future  - BRAIN NATRIURETIC PEPTIDE (BNP);  Future      Health Maintenance Due:  Health Maintenance Due   Topic Date Due    Hepatitis C screen  Never done    COVID-19 Vaccine (1) Never done    HIV screen  Never done    DTaP/Tdap/Td vaccine (1 - Tdap) Never done   [address] [place] [address]

## 2021-06-18 NOTE — PATIENT INSTRUCTIONS
Get blood work and EKG and mammogram next week    Monitor blood pressure for now; reach out next week if not controlled    Consider increase in HCTZ due to swelling    Based on EKG and blood work determine cause of swelling    Schedule mammogram and colonoscopy at your convenience    -Recommend 150 minutes of cardiovascular activity a week, or 10,000 to 15,000 steps a day, 2 days of weightbearing  -dietary wise, try to stick to your weight x 10 in calories a day  -avoid processed/refined carbohydrates (boxed/canned/frozen/fast)  -encourage healthy protein and fat, butter, avocado, egg    Patient Education        Well Visit, Ages 25 to 48: Care Instructions  Overview     Well visits can help you stay healthy. Your doctor has checked your overall health and may have suggested ways to take good care of yourself. Your doctor also may have recommended tests. At home, you can help prevent illness with healthy eating, regular exercise, and other steps. Follow-up care is a key part of your treatment and safety. Be sure to make and go to all appointments, and call your doctor if you are having problems. It's also a good idea to know your test results and keep a list of the medicines you take. How can you care for yourself at home? · Get screening tests that you and your doctor decide on. Screening helps find diseases before any symptoms appear. · Eat healthy foods. Choose fruits, vegetables, whole grains, protein, and low-fat dairy foods. Limit fat, especially saturated fat. Reduce salt in your diet. · Limit alcohol. If you are a man, have no more than 2 drinks a day or 14 drinks a week. If you are a woman, have no more than 1 drink a day or 7 drinks a week. · Get at least 30 minutes of physical activity on most days of the week. Walking is a good choice. You also may want to do other activities, such as running, swimming, cycling, or playing tennis or team sports.  Discuss any changes in your exercise program with your closely for changes in your health, and be sure to contact your doctor if you have any problems or symptoms that concern you. Where can you learn more? Go to [email or web address] org and sign in to your [employer] account. Enter P072 in the [employer] box to learn more about \"Well Visit, Ages 25 to 48: Care Instructions. \"     If you do not have an account, please click on the \"Sign Up Now\" link. Current as of: May 27, 2020               Content Version: 12.9  © 8725-0182 Healthwise, Incorporated. Care instructions adapted under license by [facility] ([facility]). If you have questions about a medical condition or this instruction, always ask your healthcare professional. [address] any warranty or liability for your use of this information.

## 2021-07-19 ENCOUNTER — HOSPITAL ENCOUNTER (OUTPATIENT)
Age: 46
Discharge: HOME | End: 2021-07-19
Payer: COMMERCIAL

## 2021-07-19 DIAGNOSIS — R06.09 DOE (DYSPNEA ON EXERTION): ICD-10-CM

## 2021-07-19 DIAGNOSIS — R60.0 LEG EDEMA: ICD-10-CM

## 2021-07-19 LAB
EKG ATRIAL RATE: 100 BPM
EKG DIAGNOSIS: NORMAL
EKG P AXIS: 61 DEGREES
EKG P-R INTERVAL: 184 MS
EKG Q-T INTERVAL: 358 MS
EKG QRS DURATION: 102 MS
EKG QTC CALCULATION (BAZETT): 461 MS
EKG R AXIS: 30 DEGREES
EKG T AXIS: 13 DEGREES
EKG VENTRICULAR RATE: 100 BPM

## 2021-09-20 DIAGNOSIS — I10 HYPERTENSION, UNSPECIFIED TYPE: ICD-10-CM

## 2021-09-20 RX ORDER — HYDROCHLOROTHIAZIDE 12.5 MG/1
12.5 CAPSULE, GELATIN COATED ORAL EVERY MORNING
Qty: 90 CAPSULE | Refills: 0 | Status: SHIPPED | OUTPATIENT
Start: 2021-09-20 | End: 2021-09-25 | Stop reason: DRUGHIGH

## 2021-09-20 RX ORDER — LOSARTAN POTASSIUM 50 MG/1
50 TABLET ORAL DAILY
Qty: 90 TABLET | Refills: 0 | Status: SHIPPED | OUTPATIENT
Start: 2021-09-20 | End: 2021-12-25 | Stop reason: SDUPTHER

## 2021-09-20 RX ORDER — METOPROLOL SUCCINATE 50 MG/1
TABLET, EXTENDED RELEASE ORAL
Qty: 90 TABLET | Refills: 0 | Status: SHIPPED | OUTPATIENT
Start: 2021-09-20 | End: 2021-12-25 | Stop reason: SDUPTHER

## 2021-09-21 ENCOUNTER — PATIENT MESSAGE (OUTPATIENT)
Dept: PRIMARY CARE CLINIC | Age: 46
End: 2021-09-21

## 2021-09-21 NOTE — TELEPHONE ENCOUNTER
From: Charlie Posada  To: Alvina Ramirez,   Sent: 9/21/2021 8:33 AM EDT  Subject: Non-Urgent Medical Question    I'm a bit confused, I scheduled an appointment on Monday 10/4 @ 8am for 3 mos f/u. It's not showing that I have any appts scheduled. I don't have anything new to discuss but dr Vanessa Odonnell told me to f/u 3 mos.

## 2021-09-24 ENCOUNTER — OFFICE VISIT (OUTPATIENT)
Dept: PRIMARY CARE CLINIC | Age: 46
End: 2021-09-24
Payer: COMMERCIAL

## 2021-09-24 VITALS
DIASTOLIC BLOOD PRESSURE: 95 MMHG | OXYGEN SATURATION: 98 % | SYSTOLIC BLOOD PRESSURE: 158 MMHG | HEIGHT: 69 IN | RESPIRATION RATE: 16 BRPM | BODY MASS INDEX: 40.73 KG/M2 | WEIGHT: 275 LBS | HEART RATE: 80 BPM

## 2021-09-24 DIAGNOSIS — R00.0 TACHYCARDIA: ICD-10-CM

## 2021-09-24 DIAGNOSIS — I49.3 PVC (PREMATURE VENTRICULAR CONTRACTION): ICD-10-CM

## 2021-09-24 DIAGNOSIS — R60.0 BILATERAL LEG EDEMA: ICD-10-CM

## 2021-09-24 DIAGNOSIS — E66.01 CLASS 2 SEVERE OBESITY DUE TO EXCESS CALORIES WITH SERIOUS COMORBIDITY AND BODY MASS INDEX (BMI) OF 38.0 TO 38.9 IN ADULT (HCC): ICD-10-CM

## 2021-09-24 DIAGNOSIS — R47.01 ANOMIC APHASIA: ICD-10-CM

## 2021-09-24 DIAGNOSIS — Z12.11 COLON CANCER SCREENING: ICD-10-CM

## 2021-09-24 DIAGNOSIS — R00.2 PALPITATIONS: Primary | ICD-10-CM

## 2021-09-24 DIAGNOSIS — I10 UNCONTROLLED HYPERTENSION: ICD-10-CM

## 2021-09-24 PROCEDURE — 99214 OFFICE O/P EST MOD 30 MIN: CPT | Performed by: FAMILY MEDICINE

## 2021-09-24 RX ORDER — HYDROCHLOROTHIAZIDE 25 MG/1
25 TABLET ORAL EVERY MORNING
Qty: 30 TABLET | Refills: 1 | Status: SHIPPED | OUTPATIENT
Start: 2021-09-24 | End: 2021-12-25 | Stop reason: SDUPTHER

## 2021-09-24 SDOH — ECONOMIC STABILITY: FOOD INSECURITY: WITHIN THE PAST 12 MONTHS, THE FOOD YOU BOUGHT JUST DIDN'T LAST AND YOU DIDN'T HAVE MONEY TO GET MORE.: NEVER TRUE

## 2021-09-24 SDOH — ECONOMIC STABILITY: FOOD INSECURITY: WITHIN THE PAST 12 MONTHS, YOU WORRIED THAT YOUR FOOD WOULD RUN OUT BEFORE YOU GOT MONEY TO BUY MORE.: NEVER TRUE

## 2021-09-24 ASSESSMENT — ENCOUNTER SYMPTOMS
CONSTIPATION: 0
BLOOD IN STOOL: 0
CHEST TIGHTNESS: 1
RHINORRHEA: 0
EYE DISCHARGE: 0
BACK PAIN: 0
DIARRHEA: 0
SORE THROAT: 0
SHORTNESS OF BREATH: 1
COLOR CHANGE: 0
EYE PAIN: 0
ABDOMINAL PAIN: 0
WHEEZING: 0

## 2021-09-24 ASSESSMENT — SOCIAL DETERMINANTS OF HEALTH (SDOH): HOW HARD IS IT FOR YOU TO PAY FOR THE VERY BASICS LIKE FOOD, HOUSING, MEDICAL CARE, AND HEATING?: NOT HARD AT ALL

## 2021-09-24 NOTE — PATIENT INSTRUCTIONS
Get blood work    Consider flu and covid vaccine    -Recommend 150 minutes of cardiovascular activity a week, or 10,000 to 15,000 steps a day, 2 days of weightbearing  -dietary wise, try to stick to your weight x 10 in calories a day  -avoid processed/refined carbohydrates (boxed/canned/frozen/fast)  -encourage healthy protein and fat, butter, avocado, egg    Increase hctz to 25 mg daily for BP and compression hose more consistently for swelling    See cardiology to discuss holter monitor vs stress echo    See neurology due to the aphasia

## 2021-09-24 NOTE — PROGRESS NOTES
Austen   1975    Consultants:   Patient Care Team:  Jaswinder Webster.DO as PCP - General (Family Medicine)  Juanita Sanchez. Mary Beth Byrd DO as PCP - [facility] Empaneled Provider    Chief Complaint:  Chief Complaint   Patient presents with    Edema     Bialteral legs and ankles .     Hypertension    Palpitations    Tachycardia    Aphasia    Memory Loss     HPI:  Austen Mims is a 40 y.o. female  is an established patient who presents for follow up of LE edema, PVC's/palpitations, tachycardia, HTN, aphasia/memory loss:    LE edema:  Usually with car ride/flight/on feet a lot but worse in past few months  Seems to be more constant  Swelling never went down  Took two of the hctz and compression hose  Went to [employer], did compression stockings during flights to manage  Seems to becoming more of a thing on a daily basis  Drinking water, some iced tea non sweet, not drinking soft drinks  Rarely uses salt  Has had on and off swelling issues for many years  More common on any given day per patient    Tachycardia, hx of PVCs:  Heart rate 87 today  EKG 7/19/21 was sinus tachycardia  Feels them more with lack of sleep, menses, stress  When heart rate goes up high gets more short of breath  No chest pain or pressure, just has to make more effort to take a breath or to recover from walking down the [place] carrying a laundry basket  Occasionally gets  A pinching feeling in her left pectoral area feels like it is coming from inside    HTN:  White coat component  Losartan 50 mg daily  HCTZ 12.5 mg, occasionally 25 mg daily when swollen, swelling comes and goes  Metoprolol 50 mg daily  BP Readings from Last 3 Encounters:   09/24/21 (!) 140/98   06/18/21 (!) 152/98   12/23/19 132/80     -Memory loss/cognitive decline:  Concerns for anomic apahasia  No known head trauma  Describes it as she loses nouns  She will not be able to remember the name of something when she wants to think about it  More prevalent around her period  Seems to be all the time per patient  Will be talking and will have to look for synonyms to help  Happens multiple times a day per patient  Min Cog appropriate see media tab    Patient Active Problem List   Diagnosis    Environmental allergies    GERD (gastroesophageal reflux disease)    Intrinsic eczema    Menorrhagia with regular cycle    HTN (hypertension)    FHx: hemochromatosis    Generalized arthritis    Migraine without aura and without status migrainosus, not intractable    Class 2 severe obesity due to excess calories with serious comorbidity and body mass index (BMI) of 38.0 to 38.9 in adult [facility])     Past Medical History:    Past Medical History:   Diagnosis Date    Abnormal Pap smear of cervix 1995    Leep     Anemia     child     Asthma     child martines     BPPV (benign paroxysmal positional vertigo)     GERD (gastroesophageal reflux disease)     History of blood transfusion     5 yrs of age    [facility] HTN (hypertension) 2005    IUD migration, initial encounter 12/4/2015    Migraines     Pap smear abnormality of cervix with ASCUS favoring benign 12/16/2013    Overview:  ASCUS HPV negative-co test in 3 years (2016)    PVC (premature ventricular contraction)     Stress incontinence      Past Surgical History:  Past Surgical History:   Procedure Laterality Date    CHOLECYSTECTOMY, LAPAROSCOPIC  2012    LEEP  1995    SHOULDER SURGERY Left 2001    SHOULDER SURGERY Right 2005    TONSILLECTOMY AND ADENOIDECTOMY         Home Meds:  Prior to Visit Medications    Medication Sig Taking? Authorizing Provider   losartan (COZAAR) 50 MG tablet Take 1 tablet by mouth daily Yes Rosey Quintanilla, DO   hydroCHLOROthiazide (MICROZIDE) 12.5 MG capsule Take 1 capsule by mouth every morning Yes Rosey Quintanilla, DO   metoprolol succinate (TOPROL XL) 50 MG extended release tablet TAKE ONE TABLET BY MOUTH DAILY Yes Rosey Quintanilla, DO   fluticasone (VERAMYST) 27.5 MCG/SPRAY nasal spray 2 sprays by Each Nostril route daily Yes Historical Provider, MD   cetirizine (ZYRTEC) 10 MG tablet Take 10 mg by mouth Yes Historical Provider, MD   esomeprazole Magnesium (NEXIUM) 20 MG PACK Take 20 mg by mouth daily  Patient not taking: Reported on 2021  Historical Provider, MD   metoprolol succinate (TOPROL XL) 50 MG extended release tablet TAKE ONE TABLET BY MOUTH DAILY  Purvi Gordon,    hydroCHLOROthiazide (MICROZIDE) 12.5 MG capsule Take 1 capsule by mouth every morning  Sushil Gordon,    losartan (COZAAR) 50 MG tablet Take 1 tablet by mouth daily  Purvi Gordon,        Allergies:    Latex, Avocado, Banana, and Thimerosal    Family History:       Problem Relation Age of Onset    Diabetes Mother     Atrial Fibrillation Mother     Thyroid Disease Mother         [facility] Rheum Arthritis Mother     Hemochromatosis Mother     Cancer Father         mouth and throat       Social History:   Social History     Socioeconomic History    Marital status:      Spouse name: Torito Castillo Number of children: 1    Years of education: 16    Highest education level: Not on file   Occupational History     Employer: [address] [place] [address] Occupation: orthopedics     Employer: [address] [place] [address]: MA   Tobacco Use    Smoking status: Former Smoker     Quit date:      Years since quittin.7    Smokeless tobacco: Never Used   Vaping Use    Vaping Use: Never used   Substance and Sexual Activity    Alcohol use: Yes     Comment: social    Drug use: No    Sexual activity: Yes     Birth control/protection: Rhythm   Other Topics Concern    Not on file   Social History Narrative    - to [facility]        -13 y/o daughter Zaina Joseph        -Weight came about with hormones per patient, never went away    25 pounds with depo    30 pounds with triphasal    Once her daughter was born 13 years ago         Social Determinants of Health     Financial Resource Strain: Low Risk     Difficulty of Paying Living Expenses: Not hard at all   Food Insecurity: No Food Insecurity    Worried About [employer] [employer] in the Last Year: Never true    Ran Out of Food in the Last Year: Never true   Transportation Needs:     Lack of Transportation (Medical):  Lack of Transportation (Non-Medical):    Physical Activity:     Days of Exercise per Week:     Minutes of Exercise per Session:    Stress:     Feeling of Stress :    Social Connections:     Frequency of Communication with Friends and Family:     Frequency of Social Gatherings with Friends and Family:     Attends Yazidi Services:     Active Member of Clubs or Organizations:     Attends Club or Organization Meetings:     Marital Status:    Intimate Partner Violence:     Fear of Current or Ex-Partner:     Emotionally Abused:     Physically Abused:     Sexually Abused:        Health Maintenance Completed:  Health Maintenance   Topic Date Due    Hepatitis C screen  Never done    COVID-19 Vaccine (1) Never done    HIV screen  Never done    Potassium monitoring  12/23/2020    Creatinine monitoring  12/23/2020    Colon cancer screen colonoscopy  Never done    Flu vaccine (1) 09/01/2021    DTaP/Tdap/Td vaccine (1 - Tdap) 10/01/2026 (Originally 4/11/1995)    Diabetes screen  10/22/2021    Cervical cancer screen  11/12/2023    Lipid screen  12/23/2024    Hepatitis A vaccine  Aged Out    Hepatitis B vaccine  Aged Out    Hib vaccine  Aged Out    Meningococcal (ACWY) vaccine  Aged Out    Pneumococcal 0-64 years Vaccine  Aged [employer] History   Administered Date(s) Administered    Influenza Virus Vaccine 10/15/2018, 10/22/2019, 10/19/2020       Review of Systems   Constitutional: Positive for diaphoresis. Negative for chills, fever and unexpected weight change. HENT: Negative for congestion, rhinorrhea and sore throat. Eyes: Negative for pain, discharge and visual disturbance.    Respiratory: Positive for chest tightness and shortness of breath. Negative for wheezing. Cardiovascular: Positive for chest pain, palpitations and leg swelling. Gastrointestinal: Negative for abdominal pain, blood in stool, constipation and diarrhea. Endocrine: Negative for polyuria. Genitourinary: Negative for dysuria and flank pain. Musculoskeletal: Negative for arthralgias, back pain and neck pain. Skin: Negative for color change, rash and wound. Allergic/Immunologic: Negative for environmental allergies, food allergies and immunocompromised state. Neurological: Negative for dizziness, speech difficulty, weakness, light-headedness and headaches. Hematological: Negative for adenopathy. Does not bruise/bleed easily. Psychiatric/Behavioral: Negative for dysphoric mood and sleep disturbance. The patient is not nervous/anxious. Stress        Vitals:    09/24/21 1317   BP: (!) 140/98   Pulse: 87   Resp: 16   SpO2: 98%   Weight: 275 lb (124.7 kg)   Height: 5' 9.02\" (1.753 m)     Body mass index is 40.59 kg/m². Wt Readings from Last 3 Encounters:   09/24/21 275 lb (124.7 kg)   06/18/21 277 lb (125.6 kg)   05/24/21 259 lb (117.5 kg)       BP Readings from Last 3 Encounters:   09/24/21 (!) 140/98   06/18/21 (!) 152/98   12/23/19 132/80       Physical Exam  Constitutional:       General: She is not in acute distress. Appearance: She is well-developed. She is obese. HENT:      Head: Normocephalic and atraumatic. Right Ear: Tympanic membrane normal.      Left Ear: Tympanic membrane normal.      Nose: Nose normal. No rhinorrhea. Mouth/Throat:      Pharynx: Uvula midline. Eyes:      Pupils: Pupils are equal, round, and reactive to light. Neck:      Trachea: No tracheal deviation. Cardiovascular:      Rate and Rhythm: Normal rate and regular rhythm. Heart sounds: Normal heart sounds. No murmur heard. No friction rub. No gallop. Pulmonary:      Effort: Pulmonary effort is normal. No respiratory distress.       Breath sounds: Normal breath sounds. No wheezing or rales. Abdominal:      General: Bowel sounds are normal. There is no distension. Palpations: Abdomen is soft. Tenderness: There is no abdominal tenderness. There is no rebound. Musculoskeletal:         General: No tenderness. Normal range of motion. Right lower le+ Edema present. Left lower le+ Edema present. Lymphadenopathy:      Cervical: No cervical adenopathy. Skin:     General: Skin is warm and dry. Findings: No erythema or rash. Neurological:      Mental Status: She is alert and oriented to person, place, and time. Cranial Nerves: No cranial nerve deficit. Deep Tendon Reflexes:      Reflex Scores:       Patellar reflexes are 2+ on the right side and 2+ on the left side. Psychiatric:         Speech: Speech normal.         Thought Content: Thought content does not include homicidal or suicidal ideation.             Lab Review:  Lab Results   Component Value Date     2019    K 4.0 2019     2019    CO2 21 2019    BUN 16 2019    CREATININE 0.6 2019    GLUCOSE 106 (H) 2019    CALCIUM 9.6 2019    PROT 7.6 2019    LABALBU 4.8 2019    BILITOT 0.3 2019    ALKPHOS 94 2019    AST 13 (L) 2019    ALT 12 2019    LABGLOM >60 2019    GFRAA >60 2019    AGRATIO 1.7 2019    GLOB 2.8 2019     Lab Results   Component Value Date    CHOL 181 2019    CHOL 176 2019    CHOL 171 10/22/2018     Lab Results   Component Value Date    TRIG 127 2019    TRIG 231 (H) 2019    TRIG 132 10/22/2018     Lab Results   Component Value Date    HDL 42 2019    HDL 37 (L) 2019    HDL 41 10/22/2018     Lab Results   Component Value Date    LDLCALC 114 (H) 2019    LDLCALC 93 2019    LDLCALC 104 (H) 10/22/2018     Lab Results   Component Value Date    LABVLDL 25 2019    LABVLDL 26 10/22/2018     No results found for: EILEEN  The 10-year ASCVD risk score (Rodrigo Sumner, et al., 2013) is: 2.2%    Values used to calculate the score:      Age: 40 years      Sex: Female      Is Non- : No      Diabetic: No      Tobacco smoker: No      Systolic Blood Pressure: 170 mmHg      Is BP treated: Yes      HDL Cholesterol: 42 mg/dL      Total Cholesterol: 181 mg/dL       Assessment/Plan:  Vaughn Alvarado is 39 y/o female who was seen today for edema, hypertension, palpitations, tachycardia, aphasia/memory loss. 1. Palpitations  2. PVC (premature ventricular contraction)  3. Tachycardia  -unclear etiology; holter vs stress testing; will have patient see cardiology to determine most appropriate next steps  - Jackie Gutierrez MD, Cardiology, [facility]    4. Bilateral leg edema  Unclear etiology; possibly secondary to HTN, obesity, ANA; Recommend compression, elevation, weight loss, CV activity, increase hctz at this time  - hydroCHLOROthiazide (HYDRODIURIL) 25 MG tablet; Take 1 tablet by mouth every morning  Dispense: 30 tablet; Refill: 1    5. Uncontrolled hypertension  Not controlled/not at goal  -Continue losartan 50 mg daily, Metoprolol 50 mg daily, increase HCTZ to 25 mg daily  Continue elevation, compression, CV activity to help with leg swelling  - Jackie Gutierrez MD, Cardiology, [facility]  - hydroCHLOROthiazide (HYDRODIURIL) 25 MG tablet; Take 1 tablet by mouth every morning  Dispense: 30 tablet; Refill: 1    6. Anomic aphasia  -pt concerned about having anomic aphasia; mini cog appropriate today (see media tab;) low threshold for neuro assessment, referral today after discussion with patient  - YULI Arambula MD, Neurology, [facility]    7.  Class 2 severe obesity due to excess calories with serious comorbidity and body mass index (BMI) of 38.0 to 38.9 in adult [facility])  -Recommend 150 minutes of cardiovascular activity a week, or 10,000 to 15,000 steps a day, 2 days of weightbearing  -dietary wise, try to stick to your weight x 10 in calories a day  -avoid processed/refined carbohydrates (boxed/canned/frozen/fast)  -encourage healthy protein and fat, butter, avocado, egg    8. Colon cancer screening  - Mack Boas, MD, Gastroenterology, [facility]    Health Maintenance Due:  Health Maintenance Due   Topic Date Due    Hepatitis C screen  Never done    COVID-19 Vaccine (1) Never done    HIV screen  Never done    Potassium monitoring  12/23/2020    Creatinine monitoring  12/23/2020    Colon cancer screen colonoscopy  Never done    Flu vaccine (1) 09/01/2021        Health Maintenance:  (F) Breast Cancer Screen: referred for mammogram on 6/18/21; encouraged to schedule/have done  (F) Cervical Cancer Screen: pap smear and hpv co testing negative 11/12/2018, repeat 11/12/2023 in 5 years since last pap/hpv co test  CRC/Colonoscopy Screening: referred for c scope  HIV Screen: (15-65 yr old, and all pregnant patients): deferred  Hep C Screen: (18-79 yr old): deferred    Immunizations:  Pt declines COVID 19 vaccine  Will get Flu shot through employer thimerosal free      Return in about 3 months (around 12/24/2021). EMR Dragon/transcription disclaimer:  Much of this encounter note is electronic transcription/translation of spoken language to printed texts. The electronic translation of spoken language may be erroneous, or at times, nonsensical words or phrases may be inadvertently transcribed. Although I have reviewed the note for such errors, some may still exist.       Purvi Mcintyre.  Peggy Garibay., DO  9/24/2021

## 2021-11-01 NOTE — TELEPHONE ENCOUNTER
LM for the pt to contact the office, pt is due for a yearly physical in December.     Henrique Malloy is requesting refill(s) HCTZ  Last OV 12/23/19 (pertaining to medication)  LR 8/3/20 (per medication requested)  Next office visit scheduled or attempted No   If no, reason:      Henrique Malloy is requesting refill(s) metoprolol  Last OV 12/23/19 (pertaining to medication)  LR 8/3/20 (per medication requested)  Next office visit scheduled or attempted No   If no, reason:
no abrasions, no jaundice, no lesions, no pruritis, and no rashes.

## 2021-12-25 DIAGNOSIS — R60.0 BILATERAL LEG EDEMA: ICD-10-CM

## 2021-12-25 DIAGNOSIS — I10 HYPERTENSION, UNSPECIFIED TYPE: ICD-10-CM

## 2021-12-25 DIAGNOSIS — I10 UNCONTROLLED HYPERTENSION: ICD-10-CM

## 2021-12-27 RX ORDER — LOSARTAN POTASSIUM 50 MG/1
50 TABLET ORAL DAILY
Qty: 90 TABLET | Refills: 0 | Status: SHIPPED | OUTPATIENT
Start: 2021-12-27 | End: 2022-04-06 | Stop reason: SDUPTHER

## 2021-12-27 RX ORDER — METOPROLOL SUCCINATE 50 MG/1
TABLET, EXTENDED RELEASE ORAL
Qty: 90 TABLET | Refills: 0 | Status: SHIPPED | OUTPATIENT
Start: 2021-12-27 | End: 2022-04-06 | Stop reason: SDUPTHER

## 2021-12-27 RX ORDER — HYDROCHLOROTHIAZIDE 25 MG/1
25 TABLET ORAL EVERY MORNING
Qty: 30 TABLET | Refills: 1 | Status: SHIPPED | OUTPATIENT
Start: 2021-12-27 | End: 2022-01-31 | Stop reason: SDUPTHER

## 2022-01-03 ENCOUNTER — HOSPITAL ENCOUNTER (OUTPATIENT)
Age: 47
Discharge: HOME | End: 2022-01-03
Payer: COMMERCIAL

## 2022-01-03 DIAGNOSIS — Z11.59 NEED FOR HEPATITIS C SCREENING TEST: ICD-10-CM

## 2022-01-03 DIAGNOSIS — Z13.1 DIABETES MELLITUS SCREENING: ICD-10-CM

## 2022-01-03 DIAGNOSIS — Z13.220 LIPID SCREENING: ICD-10-CM

## 2022-01-03 DIAGNOSIS — R06.09 DOE (DYSPNEA ON EXERTION): ICD-10-CM

## 2022-01-03 DIAGNOSIS — R60.0 LEG EDEMA: ICD-10-CM

## 2022-01-03 LAB
A/G RATIO: 1.6 (ref 1.1–2.2)
ALBUMIN SERPL-MCNC: 4.5 G/DL (ref 3.4–5)
ALP BLD-CCNC: 111 U/L (ref 40–129)
ALT SERPL-CCNC: 10 U/L (ref 10–40)
ANION GAP SERPL CALCULATED.3IONS-SCNC: 14 MMOL/L (ref 3–16)
AST SERPL-CCNC: 11 U/L (ref 15–37)
BILIRUB SERPL-MCNC: 0.4 MG/DL (ref 0–1)
BUN BLDV-MCNC: 13 MG/DL (ref 7–20)
CALCIUM SERPL-MCNC: 9.5 MG/DL (ref 8.3–10.6)
CHLORIDE BLD-SCNC: 101 MMOL/L (ref 99–110)
CHOLESTEROL, TOTAL: 185 MG/DL (ref 0–199)
CO2: 25 MMOL/L (ref 21–32)
CREAT SERPL-MCNC: 0.6 MG/DL (ref 0.6–1.1)
GFR AFRICAN AMERICAN: >60
GFR NON-AFRICAN AMERICAN: >60
GLUCOSE BLD-MCNC: 101 MG/DL (ref 70–99)
HDLC SERPL-MCNC: 37 MG/DL (ref 40–60)
HEPATITIS C ANTIBODY INTERPRETATION: NORMAL
LDL CHOLESTEROL CALCULATED: 115 MG/DL
POTASSIUM SERPL-SCNC: 3.9 MMOL/L (ref 3.5–5.1)
PRO-BNP: 35 PG/ML (ref 0–124)
SODIUM BLD-SCNC: 140 MMOL/L (ref 136–145)
TOTAL PROTEIN: 7.4 G/DL (ref 6.4–8.2)
TRIGL SERPL-MCNC: 163 MG/DL (ref 0–150)
VLDLC SERPL CALC-MCNC: 33 MG/DL

## 2022-01-03 PROCEDURE — 36415 COLL VENOUS BLD VENIPUNCTURE: CPT

## 2022-01-03 PROCEDURE — 83880 ASSAY OF NATRIURETIC PEPTIDE: CPT

## 2022-01-03 PROCEDURE — 80061 LIPID PANEL: CPT

## 2022-01-03 PROCEDURE — 80053 COMPREHEN METABOLIC PANEL: CPT

## 2022-01-03 PROCEDURE — 83036 HEMOGLOBIN GLYCOSYLATED A1C: CPT

## 2022-01-03 PROCEDURE — 86803 HEPATITIS C AB TEST: CPT

## 2022-01-04 LAB
ESTIMATED AVERAGE GLUCOSE: 105.4 MG/DL
HBA1C MFR BLD: 5.3 %

## 2022-01-18 PROBLEM — R00.0 TACHYCARDIA: Status: ACTIVE | Noted: 2022-01-18

## 2022-01-18 NOTE — PROGRESS NOTES
[address] [facility]  [phone]      Attending Physician: No att. providers found  Reason for Consultation/Chief Complaint:   Palpitations, uncontrolled hypertension    Subjective   History of Present Illness: Cece Burleson is a 40 y.o. female with a history of uncontrolled hypertension, PVCs, GERD, and morbid obesity referred for further evaluation of palpitations and uncontrolled hypertension. The patient reports that recently she has been having frequent episodes where she feels like her heart is racing or pounding. These episodes seem to occur both at rest and with exertion and says that she will sometimes feel like her heart rate is elevated for days at a time. She denies any associated chest pain, shortness of breath, lightheadedness, dizziness, or syncope. She does describe chronic shortness of breath with exertion which she says has been stable. She says that she also notices lower extremity edema at times that seems to come and go. Her BP has also been running about goal recently and her PCP increased her hydrochlorothiazide to 25 mg daily at her last visit on 9/24/21. She consumes one caffeinated coffee per day. She rarely drinks soda or alcohol. She denies any illicit drug use. She smoked cigarettes in the past, but quit about 16 years ago. She was reportedly diagnosed with symptomatic PVCs in the past and has been taking a beta-blocker. Past Medical History:   has a past medical history of Abnormal Pap smear of cervix, Anemia, Asthma, BPPV (benign paroxysmal positional vertigo), GERD (gastroesophageal reflux disease), History of blood transfusion, HTN (hypertension), IUD migration, initial encounter, Migraines, Pap smear abnormality of cervix with ASCUS favoring benign, PVC (premature ventricular contraction), and Stress incontinence.     Surgical History:   has a past surgical history that includes shoulder surgery (Left, 2001); shoulder surgery (Right, 2005); Tonsillectomy and adenoidectomy; Cholecystectomy, laparoscopic (2012); and LEEP (1995). Social History:   reports that she quit smoking about 17 years ago. She has never used smokeless tobacco. She reports current alcohol use. She reports that she does not use drugs. Family History:  family history includes Atrial Fibrillation in her mother; Cancer in her father; Diabetes in her mother; Hemochromatosis in her mother; Rheum Arthritis in her mother; Thyroid Disease in her mother. Home Medications:  Were reviewed and are listed in nursing record and/or below  Prior to Admission medications    Medication Sig Start Date End Date Taking? Authorizing Provider   losartan (COZAAR) 50 MG tablet Take 1 tablet by mouth daily 12/27/21  Yes Michele Pickering., DO   metoprolol succinate (TOPROL XL) 50 MG extended release tablet TAKE ONE TABLET BY MOUTH DAILY 12/27/21  Yes Michele Pimentel, DO   hydroCHLOROthiazide (HYDRODIURIL) 25 MG tablet Take 1 tablet by mouth every morning 12/27/21  Yes Margot Pimentel, DO   esomeprazole Magnesium (NEXIUM) 20 MG PACK Take 20 mg by mouth daily    Yes Historical Provider, MD   fluticasone (VERAMYST) 27.5 MCG/SPRAY nasal spray 2 sprays by Each Nostril route daily   Yes Historical Provider, MD   cetirizine (ZYRTEC) 10 MG tablet Take 10 mg by mouth   Yes Historical Provider, MD   metoprolol succinate (TOPROL XL) 50 MG extended release tablet TAKE ONE TABLET BY MOUTH DAILY 11/23/20   Michele Pickering., DO   hydroCHLOROthiazide (MICROZIDE) 12.5 MG capsule Take 1 capsule by mouth every morning 11/23/20   Michele Pickering., DO   losartan (COZAAR) 50 MG tablet Take 1 tablet by mouth daily 8/3/20   Michele Pickering., DO        CURRENT Medications:  No current facility-administered medications for this visit. Allergies:  Latex, Avocado, Banana, and Thimerosal     Review of Systems:   Review of Systems   Constitutional: Negative for chills and fever.    HENT: Negative for congestion, rhinorrhea and sore throat. Eyes: Negative for photophobia, pain and visual disturbance. Respiratory: Positive for shortness of breath. Negative for cough. Cardiovascular: Positive for palpitations and leg swelling. Negative for chest pain. Gastrointestinal: Negative for abdominal pain, blood in stool, constipation, diarrhea, nausea and vomiting. Endocrine: Negative for cold intolerance and heat intolerance. Genitourinary: Negative for difficulty urinating, dysuria and hematuria. Musculoskeletal: Positive for arthralgias and myalgias. Negative for joint swelling. Skin: Negative for rash and wound. Allergic/Immunologic: Negative for environmental allergies and food allergies. Neurological: Negative for dizziness, syncope and light-headedness. Hematological: Negative for adenopathy. Does not bruise/bleed easily. Psychiatric/Behavioral: Negative for dysphoric mood. The patient is not nervous/anxious. Objective   PHYSICAL EXAM:    Vitals:    01/19/22 1452   BP: 135/78   Pulse: 106   SpO2: 97% on room air    Weight: 276 lb (125.2 kg)       General: Morbidly obese adult female in no acute distress. Pleasant and interactive on exam.  HEENT: Normocephalic, atraumatic, non-icteric, hearing intact, nares normal, mucous membranes moist.  Neck: Supple, trachea midline. No adenopathy. No thyromegaly. No carotid bruits. No JVD. Heart: Tachycardic with regular rhythm. Normal S1 and S2. No murmurs, gallops or rubs. Lungs: Normal respiratory effort. Clear to auscultation bilaterally. No wheezes, rales, or rhonchi. Abdomen: Soft, non-tender. Normoactive bowel sounds. No masses or organomegaly. Skin: No rashes, wounds, or lesions. Pulses: 2+ and symmetric. Extremities: No clubbing, cyanosis, or edema. Musculoskeletal: Spontaneously moves all four extremities. Psych: Normal mood and affect. Neuro: Alert and oriented to person, place, and time. No focal deficits noted.       Labs CBC:   Lab Results   Component Value Date    WBC 8.9 12/23/2019    RBC 4.75 12/23/2019    HGB 13.6 12/23/2019    HCT 40.6 12/23/2019    MCV 85.5 12/23/2019    RDW 13.6 12/23/2019     12/23/2019     CMP:  Lab Results   Component Value Date     01/03/2022    K 3.9 01/03/2022     01/03/2022    CO2 25 01/03/2022    BUN 13 01/03/2022    CREATININE 0.6 01/03/2022    GFRAA >60 01/03/2022    AGRATIO 1.6 01/03/2022    LABGLOM >60 01/03/2022    GLUCOSE 101 01/03/2022    PROT 7.4 01/03/2022    CALCIUM 9.5 01/03/2022    BILITOT 0.4 01/03/2022    ALKPHOS 111 01/03/2022    AST 11 01/03/2022    ALT 10 01/03/2022     PT/INR:  No results found for: PTINR  HgBA1c:  Lab Results   Component Value Date    LABA1C 5.3 01/03/2022     No results found for: CKTOTAL, CKMB, CKMBINDEX, TROPONINI      Cardiac Data     Last EKG 1/19/22:  Sinus tachycardia, non-specific T wave abnormality    Echo:  TTE 9/2/11:  Study Conclusions   - Left ventricle: The cavity size was normal. Wall thickness was     normal. Systolic function was normal. The estimated ejection     fraction was 60%. Wall motion was normal; there were no regional     wall motion abnormalities. Left ventricular diastolic function     parameters were normal.      Stress Test:  Nuclear SPECT Stress 9/2/11:  IMPRESSION:   Normal myocardial perfusion scan.     No findings of stress induced reversible ischemia. Cath: N/A      Assessment:     1. Palpitations - Will arrange for 2-week [facility] Extended Holter monitor to evaluate for potential tachyarrhythmias and inappropriate sinus tachycardia and re-assess PVC burden. 2. Essential hypertension - BP had recently been running above goal and her HCTZ dose was increased by her PCP to 25 mg daily at her last visit on 9/24/21. She is also taking losartan 50 mg daily and metoprolol succinate 50 mg daily. BP was 140/85 in clinic today on initial check and 135/78 on repeat.   3. Sinus tachycardia - May be secondary to deconditioning and further exacerbated by anxiety. Arranging for 2-week cardiac event monitor to further evaluate for possible inappropriate sinus tachycardia as above. 4. Lower extremity edema - Seems to come and go per patient report. She does not appear to have any significant swelling on exam today and suspect symptoms are likely attributable to mild venous insufficiency. She currently has no other symptoms or clinical findings of heart failure. Pro-BNP was only 35 when checked on 1/3/22. 5. PVCs  6. Morbid obesity - BMI 40.73.  7. GERD  8. Former tobacco use      Plan:     1. Will arrange for 2-week [employer] Extended Holter monitor to evaluate for potential tachyarrhythmias and inappropriate sinus tachycardia and re-assess PVC burden. 2. Obtain transthoracic echocardiogram for formal evaluation of cardiac structure and function. 3. Check magnesium and TSH levels. Recent BMP was unremarkable aside from mild hypoglycemia. 4. Overall, her BP seems to be better controlled following recent increase in her dose of hydrochlorothiazide and will plan to continue with current regimen of HCTZ 25 mg daily, losartan 50 mg daily, and metoprolol succinate 50 mg daily for now. She will continue to need close monitoring of her BP and if consistently elevated above goal on repeat evaluation, would plan to increase losartan at that time. 5. Encouraged patient to make sure that she is staying well hydrated and to try to limit her caffeine consumption. 6. Continue to encourage avoidance of tobacco products. 7. She will benefit from weight loss through both dietary modifications and regular physical exercise. 8. Follow-up with me in one month. Scribe's attestation: This note was scribed in the presence of Dr. Narciso Jordan DO   by Dipti Delaney LPN      It is a pleasure to assist in the care of Oneil Osborn. Please call with any questions.   The scribes documentation has been prepared under my direction and personally reviewed by me in its entirety. I confirm that the note above accurately reflects all work, treatment, procedures, and medical decision making performed by me. I, Dr. Santos Sutton, personally performed the services described in this documentation as scribed by Elisabeth Fountain LPN in my presence, and it is both accurate and complete to the best of our ability. Thank you for allowing us to participate in the care of [place] [facility]. Please call me with any questions [phone]. Santos Sutton,   [facility]  [phone] [facility]  [phone] [address]  1/19/2022 3:08 PM      I will address the patient's cardiac risk factors and adjusted pharmacologic treatment as needed. In addition, I have reinforced the need for patient directed risk factor modification. All questions and concerns were addressed to the patient/family. Alternatives to my treatment were discussed. The note was completed using EMR. Every effort was made to ensure accuracy; however, inadvertent computerized transcription errors may be present.

## 2022-01-19 ENCOUNTER — OFFICE VISIT (OUTPATIENT)
Dept: CARDIOLOGY CLINIC | Age: 47
End: 2022-01-19
Payer: COMMERCIAL

## 2022-01-19 ENCOUNTER — HOSPITAL ENCOUNTER (OUTPATIENT)
Age: 47
Discharge: HOME | End: 2022-01-19
Payer: COMMERCIAL

## 2022-01-19 VITALS
SYSTOLIC BLOOD PRESSURE: 135 MMHG | BODY MASS INDEX: 40.73 KG/M2 | DIASTOLIC BLOOD PRESSURE: 78 MMHG | OXYGEN SATURATION: 97 % | HEART RATE: 106 BPM | WEIGHT: 276 LBS

## 2022-01-19 DIAGNOSIS — K21.9 GASTROESOPHAGEAL REFLUX DISEASE WITHOUT ESOPHAGITIS: ICD-10-CM

## 2022-01-19 DIAGNOSIS — I49.3 PVC (PREMATURE VENTRICULAR CONTRACTION): ICD-10-CM

## 2022-01-19 DIAGNOSIS — R60.0 LOWER EXTREMITY EDEMA: ICD-10-CM

## 2022-01-19 DIAGNOSIS — I10 ESSENTIAL HYPERTENSION: Primary | ICD-10-CM

## 2022-01-19 DIAGNOSIS — R00.0 SINUS TACHYCARDIA: ICD-10-CM

## 2022-01-19 DIAGNOSIS — R00.2 PALPITATIONS: ICD-10-CM

## 2022-01-19 DIAGNOSIS — E66.01 MORBID OBESITY (HCC): ICD-10-CM

## 2022-01-19 DIAGNOSIS — Z87.891 FORMER TOBACCO USE: ICD-10-CM

## 2022-01-19 LAB
MAGNESIUM: 1.8 MG/DL (ref 1.8–2.4)
TSH REFLEX FT4: 3.72 UIU/ML (ref 0.27–4.2)

## 2022-01-19 PROCEDURE — 99204 OFFICE O/P NEW MOD 45 MIN: CPT | Performed by: INTERNAL MEDICINE

## 2022-01-19 PROCEDURE — 93000 ELECTROCARDIOGRAM COMPLETE: CPT | Performed by: INTERNAL MEDICINE

## 2022-01-19 PROCEDURE — 84443 ASSAY THYROID STIM HORMONE: CPT

## 2022-01-19 PROCEDURE — 36415 COLL VENOUS BLD VENIPUNCTURE: CPT

## 2022-01-19 PROCEDURE — 83735 ASSAY OF MAGNESIUM: CPT

## 2022-01-19 NOTE — PATIENT INSTRUCTIONS
PLAN:  1. Cardiac event monitor-PVC's, 2 weeks [employer]x continuous  2. Lab work-TSH and mag level. We will call you with results  3. ECHO test-assess heart function. Call [phone] to schedule this test. We will call you with the results. 4. Cut back on caffeine consumption  5.  Follow up 1 month

## 2022-01-20 ENCOUNTER — TELEPHONE (OUTPATIENT)
Dept: CARDIOLOGY CLINIC | Age: 47
End: 2022-01-20

## 2022-01-25 ASSESSMENT — ENCOUNTER SYMPTOMS
NAUSEA: 0
RHINORRHEA: 0
SORE THROAT: 0
DIARRHEA: 0
PHOTOPHOBIA: 0
CONSTIPATION: 0
EYE PAIN: 0
VOMITING: 0
SHORTNESS OF BREATH: 1
COUGH: 0
BLOOD IN STOOL: 0
ABDOMINAL PAIN: 0

## 2022-01-31 DIAGNOSIS — I10 UNCONTROLLED HYPERTENSION: ICD-10-CM

## 2022-01-31 DIAGNOSIS — R60.0 BILATERAL LEG EDEMA: ICD-10-CM

## 2022-01-31 RX ORDER — HYDROCHLOROTHIAZIDE 25 MG/1
25 TABLET ORAL EVERY MORNING
Qty: 30 TABLET | Refills: 1 | Status: SHIPPED | OUTPATIENT
Start: 2022-01-31 | End: 2022-02-04 | Stop reason: SDUPTHER

## 2022-02-04 ENCOUNTER — VIRTUAL VISIT (OUTPATIENT)
Dept: PRIMARY CARE CLINIC | Age: 47
End: 2022-02-04
Payer: COMMERCIAL

## 2022-02-04 DIAGNOSIS — E66.01 CLASS 3 SEVERE OBESITY DUE TO EXCESS CALORIES WITH SERIOUS COMORBIDITY AND BODY MASS INDEX (BMI) OF 40.0 TO 44.9 IN ADULT (HCC): ICD-10-CM

## 2022-02-04 DIAGNOSIS — R60.0 EDEMA OF LOWER EXTREMITY: ICD-10-CM

## 2022-02-04 DIAGNOSIS — R00.0 TACHYCARDIA: ICD-10-CM

## 2022-02-04 DIAGNOSIS — I10 PRIMARY HYPERTENSION: ICD-10-CM

## 2022-02-04 DIAGNOSIS — I49.3 PVC (PREMATURE VENTRICULAR CONTRACTION): ICD-10-CM

## 2022-02-04 DIAGNOSIS — R47.89 WORD FINDING PROBLEM: ICD-10-CM

## 2022-02-04 DIAGNOSIS — I10 UNCONTROLLED HYPERTENSION: Primary | ICD-10-CM

## 2022-02-04 PROCEDURE — 99214 OFFICE O/P EST MOD 30 MIN: CPT | Performed by: FAMILY MEDICINE

## 2022-02-04 PROCEDURE — 93272 ECG/REVIEW INTERPRET ONLY: CPT | Performed by: INTERNAL MEDICINE

## 2022-02-04 RX ORDER — HYDROCHLOROTHIAZIDE 25 MG/1
25 TABLET ORAL EVERY MORNING
Qty: 90 TABLET | Refills: 3 | Status: SHIPPED | OUTPATIENT
Start: 2022-02-04 | End: 2022-04-06 | Stop reason: SDUPTHER

## 2022-02-04 ASSESSMENT — ENCOUNTER SYMPTOMS
ABDOMINAL PAIN: 0
CONSTIPATION: 0
SHORTNESS OF BREATH: 0
BLOOD IN STOOL: 0
DIARRHEA: 0

## 2022-02-04 NOTE — PROGRESS NOTES
Chief Complaint   Patient presents with    3 Month Follow-Up    Hypertension    Palpitations    Edema    Memory Loss    Obesity     Subjective      HPI     Caro Delaney is 39 y/o female here today for HTN, palpitations, tachycardia, lower extremity zita, SOB, deconditioning, memory loss/cognitive decline, Obesity:    -HTN, palpitations, SOB, LE edema, deconditioning:  Pulse was racing at different times  Saw Dr. Clifton Castillo of cardiology  Considering echocardiogram  Wore holter monitor for 30 days  On metoprolol 50 mg daily, losartan 50 mg daily, increased hctz 25 mg daily    -Other concerns memory loss/cognitive decline: Anomic aphasia  Loses nouns and gets more pronounced around her period  Lost someone at work who had quit  Trying to balance all things  Mini cog normal last OV  Can be sitting with friends having random conversation and     -Obesity:  Weight down about 7 lbs  Patient-Reported Vitals 2/4/2022   Patient-Reported Weight 269.7   Patient-Reported Pulse 83      Wt Readings from Last 3 Encounters:   01/19/22 276 lb (125.2 kg)   09/24/21 275 lb (124.7 kg)   06/18/21 277 lb (125.6 kg)     Review of Systems   Constitutional: Negative for chills, fever and unexpected weight change. Eyes: Negative for visual disturbance. Respiratory: Negative for shortness of breath. Cardiovascular: Negative for chest pain. Gastrointestinal: Negative for abdominal pain, blood in stool, constipation and diarrhea. Endocrine: Negative for polyuria. Genitourinary: Negative for dysuria and hematuria. Musculoskeletal: Negative for arthralgias. Skin: Negative for rash. Neurological: Negative for weakness, numbness and headaches. Psychiatric/Behavioral: Negative for dysphoric mood. The patient is not nervous/anxious. Objective      Patient-Reported Vitals  Patient-Reported Pulse: 83  Patient-Reported Weight: 269.7       Physical Exam  Constitutional:       Appearance: Normal appearance.  She is not ill-appearing. HENT:      Head: Normocephalic and atraumatic. Eyes:      Extraocular Movements: Extraocular movements intact. Pulmonary:      Effort: Pulmonary effort is normal.   Neurological:      General: No focal deficit present. Mental Status: She is alert and oriented to person, place, and time. Mental status is at baseline. Psychiatric:         Mood and Affect: Mood normal.         Behavior: Behavior normal.         Thought Content: Thought content normal.         Assessment and Planning:  Brynn Lane is 39 y/o female who was seen today for 3 month follow-up, hypertension, palpitations, edema, memory loss/word finding/cognitive decline, and obesity. 1. Uncontrolled hypertension  2. Primary hypertension  -at goal on current regimen, following with cardiology now, Dr. Thalia Maurice  -continue losartan 50 mg daily, hctz 25 mg daily, and metoprolol XL 50 mg daily  - hydroCHLOROthiazide (HYDRODIURIL) 25 MG tablet; Take 1 tablet by mouth every morning  Dispense: 90 tablet; Refill: 3    3. Tachycardia  4. PVC (premature ventricular contraction)  -Following with Dr. Thalia Maurice and wearing holter monitor  -taking metoprolol XL 50 mg daily  -pt and cardiology will consider alternative beta blocker or other rate controller depending on holter monitor  -defer rate control/cardiac follow up to cardiology    5. Edema of lower extremity  -worse in warm weather/Summers, unclear etiology; gravity dependent, vs venous insufficiency vs lymph edema vs ANA vs other etiology  -continue thiazide, consider loop  - hydroCHLOROthiazide (HYDRODIURIL) 25 MG tablet; Take 1 tablet by mouth every morning  Dispense: 90 tablet; Refill: 3    6. Word finding problem  -pt referred to neurology last OV, has not had time to follow up, correlates with her menses  -mini cog normal lat OV    7.  Class 3 severe obesity due to excess calories with serious comorbidity and body mass index (BMI) of 40.0 to 44.9 in adult (HCC)  -weight down about 7 lbs  -continue diet/exercise encouragement  -Recommend 150 minutes of cardiovascular activity a week, or 10,000 to 15,000 steps a day, 2 days of weightbearing  -dietary wise, try to stick to your weight x 10 in calories a day  -avoid processed/refined carbohydrates (boxed/canned/frozen/fast)  -encourage healthy protein and fat, butter, avocado, egg    -Spent 30-39 minutes of face to face virtual interaction with patient counseling on diagnoses and treatment plan; including but not limited to pre visit planning, chart/lab review, new orders, instructions, charting    \"THIS VISIT WAS COMPLETED VIRTUALLY USING DOXY. ME\"    Tahmina Buck, was evaluated through a synchronous (real-time) audio-video encounter. The patient (or guardian if applicable) is aware that this is a billable service, which includes applicable co-pays. This Virtual Visit was conducted with patient's (and/or legal guardian's) consent. The visit was conducted pursuant to the emergency declaration under the [address] [place] [address], [address] [place] [address] [facility] authority and the I[employer] and [employer] Act. Patient identification was verified, and a caregiver was present when appropriate. The patient was located at home in a state where the provider was licensed to provide care. Nickolas Venegas.  Mounika Ferguson., DO

## 2022-02-05 PROBLEM — E66.813 CLASS 3 SEVERE OBESITY DUE TO EXCESS CALORIES WITH SERIOUS COMORBIDITY AND BODY MASS INDEX (BMI) OF 40.0 TO 44.9 IN ADULT: Status: ACTIVE | Noted: 2019-12-23

## 2022-02-09 ENCOUNTER — TELEPHONE (OUTPATIENT)
Dept: CARDIOLOGY CLINIC | Age: 47
End: 2022-02-09

## 2022-02-09 DIAGNOSIS — I49.3 PVC (PREMATURE VENTRICULAR CONTRACTION): ICD-10-CM

## 2022-03-31 ENCOUNTER — PATIENT MESSAGE (OUTPATIENT)
Dept: CARDIOLOGY CLINIC | Age: 47
End: 2022-03-31

## 2022-04-06 DIAGNOSIS — I10 UNCONTROLLED HYPERTENSION: ICD-10-CM

## 2022-04-06 DIAGNOSIS — R60.0 EDEMA OF LOWER EXTREMITY: ICD-10-CM

## 2022-04-06 DIAGNOSIS — I10 PRIMARY HYPERTENSION: ICD-10-CM

## 2022-04-06 DIAGNOSIS — I10 HYPERTENSION, UNSPECIFIED TYPE: ICD-10-CM

## 2022-04-06 RX ORDER — HYDROCHLOROTHIAZIDE 25 MG/1
25 TABLET ORAL EVERY MORNING
Qty: 90 TABLET | Refills: 3 | Status: SHIPPED | OUTPATIENT
Start: 2022-04-06

## 2022-04-06 RX ORDER — LOSARTAN POTASSIUM 50 MG/1
50 TABLET ORAL DAILY
Qty: 90 TABLET | Refills: 0 | Status: SHIPPED | OUTPATIENT
Start: 2022-04-06 | End: 2022-04-07

## 2022-04-06 RX ORDER — METOPROLOL SUCCINATE 50 MG/1
TABLET, EXTENDED RELEASE ORAL
Qty: 90 TABLET | Refills: 0 | Status: SHIPPED | OUTPATIENT
Start: 2022-04-06 | End: 2022-04-07

## 2022-04-07 DIAGNOSIS — I10 HYPERTENSION, UNSPECIFIED TYPE: ICD-10-CM

## 2022-04-07 RX ORDER — METOPROLOL SUCCINATE 50 MG/1
TABLET, EXTENDED RELEASE ORAL
Qty: 90 TABLET | Refills: 0 | Status: SHIPPED | OUTPATIENT
Start: 2022-04-07 | End: 2022-06-29

## 2022-04-07 RX ORDER — LOSARTAN POTASSIUM 50 MG/1
TABLET ORAL
Qty: 90 TABLET | Refills: 0 | Status: SHIPPED | OUTPATIENT
Start: 2022-04-07 | End: 2022-11-01 | Stop reason: SDUPTHER

## 2022-05-12 DIAGNOSIS — M53.3 CHRONIC SI JOINT PAIN: Primary | ICD-10-CM

## 2022-05-12 DIAGNOSIS — M51.36 DDD (DEGENERATIVE DISC DISEASE), LUMBAR: ICD-10-CM

## 2022-05-12 DIAGNOSIS — M54.50 LUMBAR PAIN: ICD-10-CM

## 2022-05-12 DIAGNOSIS — G89.29 CHRONIC SI JOINT PAIN: Primary | ICD-10-CM

## 2022-05-12 RX ORDER — METHYLPREDNISOLONE 4 MG/1
TABLET ORAL
Qty: 1 KIT | Refills: 0 | Status: SHIPPED | OUTPATIENT
Start: 2022-05-12 | End: 2022-06-01 | Stop reason: ALTCHOICE

## 2022-06-01 ENCOUNTER — OFFICE VISIT (OUTPATIENT)
Dept: ORTHOPEDIC SURGERY | Age: 47
End: 2022-06-01
Payer: COMMERCIAL

## 2022-06-01 VITALS — BODY MASS INDEX: 39.99 KG/M2 | HEIGHT: 69 IN | WEIGHT: 270 LBS

## 2022-06-01 DIAGNOSIS — M54.16 RIGHT LUMBAR RADICULITIS: ICD-10-CM

## 2022-06-01 DIAGNOSIS — M53.3 CHRONIC SI JOINT PAIN: ICD-10-CM

## 2022-06-01 DIAGNOSIS — G89.29 CHRONIC SI JOINT PAIN: ICD-10-CM

## 2022-06-01 DIAGNOSIS — M51.36 DDD (DEGENERATIVE DISC DISEASE), LUMBAR: Primary | ICD-10-CM

## 2022-06-01 PROCEDURE — 99213 OFFICE O/P EST LOW 20 MIN: CPT | Performed by: PHYSICIAN ASSISTANT

## 2022-06-01 RX ORDER — PREDNISONE 10 MG/1
TABLET ORAL
Qty: 26 TABLET | Refills: 0 | Status: SHIPPED | OUTPATIENT
Start: 2022-06-01 | End: 2022-11-01

## 2022-06-01 RX ORDER — METHOCARBAMOL 750 MG/1
TABLET, FILM COATED ORAL
Qty: 60 TABLET | Refills: 2 | Status: SHIPPED | OUTPATIENT
Start: 2022-06-01

## 2022-06-01 NOTE — PROGRESS NOTES
FOLLOW UP: SPINE    6/1/2022     CHIEF COMPLAINT:    Chief Complaint   Patient presents with    Lower Back Pain       HISTORY OF PRESENT ILLNESS:              The patient is a 56 y.o. female AA here with [facility], history of hypertension here to follow-up after physical therapy (HEP) and pharmacologic care for chronic worsening low back and leg pain. She reports a >15-year history of aching low back/buttock pain right greater than left which is been worsening over the last several months. In addition, she will experience burning pain radiating into her right buttock, posterior thigh/calf. At times she will have tingling in her right foot. She reports subjective leg weakness. Pain is now constant but increased with standing, transition or walking. Some relief with sitting, resting or leaning forward. Conservative care includes: Recent MDP, Aleve, Tylenol, physical therapy/HEP. She denies any significant benefit at this time and feels symptoms are worsening. She denies any progressive extremity weakness. She denies any recent bowel or bladder dysfunction or saddle anesthesia. No recent fevers chills or infections. No recent direct trauma. She was previously tested for rheumatoid arthritis which was negative.      Current/Past Treatment:   · Physical Therapy: Yes, HEP  · Chiropractic:   No  · Injection:     Medications:            NSAIDS: Aleve            Muscle relaxer:   Drowsiness with Flexeril            Steriods:   MDP recent            Neuropathic medications:              Opioids:            Other:   · Surgery/Consult: No    Work Status/Functionality: AA/MA with [employer]    Past Medical History: Medical history form was reviewed today & scanned into the media tab  Past Medical History:   Diagnosis Date    Abnormal Pap smear of cervix 1995    [place]     Anemia     child     Asthma     child martines     BPPV (benign paroxysmal positional vertigo)     GERD (gastroesophageal reflux disease)     History of blood transfusion     5 yrs of age    Ardyth Moritz HTN (hypertension) 2005    IUD migration, initial encounter 12/4/2015    Migraines     Pap smear abnormality of cervix with ASCUS favoring benign 12/16/2013    Overview:  ASCUS HPV negative-co test in 3 years (2016)    PVC (premature ventricular contraction)     Stress incontinence       Past Surgical History:     Past Surgical History:   Procedure Laterality Date    CHOLECYSTECTOMY, LAPAROSCOPIC  2012    LEEP  1995    SHOULDER SURGERY Left 2001    SHOULDER SURGERY Right 2005    TONSILLECTOMY AND ADENOIDECTOMY       Current Medications:     Current Outpatient Medications:     methocarbamol (ROBAXIN-750) 750 MG tablet, I po BID PRN, Disp: 60 tablet, Rfl: 2    predniSONE (DELTASONE) 10 MG tablet, SIG: iii po BID x 2 days then ii po BID x 2 days then i po BID x 2 days then i po qd x 2 days, Disp: 26 tablet, Rfl: 0    methylPREDNISolone (MEDROL, PATRICE,) 4 MG tablet, Take by mouth., Disp: 1 kit, Rfl: 0    losartan (COZAAR) 50 MG tablet, TAKE 1 TABLET BY MOUTH ONE TIME A DAY, Disp: 90 tablet, Rfl: 0    metoprolol succinate (TOPROL XL) 50 MG extended release tablet, TAKE 1 TABLET BY MOUTH ONE TIME A DAY, Disp: 90 tablet, Rfl: 0    hydroCHLOROthiazide (HYDRODIURIL) 25 MG tablet, Take 1 tablet by mouth every morning, Disp: 90 tablet, Rfl: 3    esomeprazole Magnesium (NEXIUM) 20 MG PACK, Take 20 mg by mouth daily , Disp: , Rfl:     fluticasone (VERAMYST) 27.5 MCG/SPRAY nasal spray, 2 sprays by Each Nostril route daily, Disp: , Rfl:     cetirizine (ZYRTEC) 10 MG tablet, Take 10 mg by mouth, Disp: , Rfl:   Allergies:  Latex, Avocado, Banana, and Thimerosal  Social History:    reports that she quit smoking about 17 years ago. She has never used smokeless tobacco. She reports current alcohol use. She reports that she does not use drugs.   Family History:   Family History   Problem Relation Age of Onset    Diabetes Mother     Atrial Fibrillation Mother    Ardyth Moritz Thyroid Disease Mother         hashimoto   Ardyth Moritz Rheum Arthritis Mother     Hemochromatosis Mother     Cancer Father         mouth and throat       REVIEW OF SYSTEMS: Full ROS reviewed & scanned into chart  CONSTITUTIONAL: Denies unexplained weight loss, fevers   SKIN: Denies skin conditions, skin cancer    PHYSICAL EXAM:    Vitals: Height 5' 9\" (1.753 m), weight 270 lb (122.5 kg), not currently breastfeeding. GENERAL EXAM:  · General Apparence: Patient is adequately groomed with no evidence of malnutrition. · Orientation: The patient is oriented to time, place and person. · Mood & Affect:The patient's mood and affect are appropriate   · Sensation: Sensation is intact without deficit  · Coordination/Balance: Good coordination   \  LUMBAR/SACRAL EXAMINATION:  · Inspection: Local inspection shows no step-off or bruising. Lumbar alignment is normal.     · Palpation:   No evidence of tenderness at the midline. No tenderness bilaterally at the paraspinal or trochanters. There is no step-off or paraspinal spasm. · Range of Motion: Full flexion, mild to moderate loss of extension, she has some discomfort with extension  · Strength:   Strength testing is 5/5 in all muscle groups tested. · Special Tests:   Straight leg raise and crossed SLR negative. · Skin: There are no rashes, ulcerations or lesions. · Reflexes: Reflexes are symmetrically 1-2+ at the patellar and ankle tendons with reinforcement  · Gait & station: Normal unassisted  · Additional Examinations: Positive right FFT, positive right Mehreen's  · RIGHT LOWER EXTREMITY: Inspection/examination of the right lower extremity does not show any tenderness, deformity or injury. Range of motion is full. There is no gross instability. There are no rashes, ulcerations or lesions. Strength and tone are normal  · LEFT LOWER EXTREMITY:  Inspection/examination of the left lower extremity does not show any tenderness, deformity or injury. Range of motion is full. There is no gross instability. There are no rashes, ulcerations or lesions. Strength and tone are normal.    Diagnostic Testin views lumbar spine 2021 moderate to severe L5-S1 DDD, questionable widening SI joints    Labs reviewed 2022 last HgbA1C 5.3        Impression:  1) Chronic worsening LBP, right lumbar radiculitis, contributing sacroiliitis  2) Mod-severe L5-S1 DDD  3) H/o joint hypermobility        Plan:   1) We had a long discussion.   I recommend lumbar MRI scan assess through sacrum for further evaluation  2) Prednisone taper--DC NSAIDs during  3) Robaxin 500mg I po BID PRN  4) PT/HEP review  5) She will call for L MRI test results and further recommendations         Electronically signed by Perla Sullivan PA-C, JO on 2022 at 10:43 AM     Perla Sullivan PA-C, JO  Board Certified by the [facility]

## 2022-06-21 ENCOUNTER — TELEPHONE (OUTPATIENT)
Dept: ORTHOPEDIC SURGERY | Age: 47
End: 2022-06-21

## 2022-06-21 DIAGNOSIS — M51.36 DDD (DEGENERATIVE DISC DISEASE), LUMBAR: Primary | ICD-10-CM

## 2022-06-21 DIAGNOSIS — M53.3 CHRONIC SI JOINT PAIN: ICD-10-CM

## 2022-06-21 DIAGNOSIS — G89.29 CHRONIC SI JOINT PAIN: ICD-10-CM

## 2022-06-21 DIAGNOSIS — M54.42 CHRONIC BILATERAL LOW BACK PAIN WITH BILATERAL SCIATICA: ICD-10-CM

## 2022-06-21 DIAGNOSIS — G89.29 CHRONIC BILATERAL LOW BACK PAIN WITH BILATERAL SCIATICA: ICD-10-CM

## 2022-06-21 DIAGNOSIS — M54.41 CHRONIC BILATERAL LOW BACK PAIN WITH BILATERAL SCIATICA: ICD-10-CM

## 2022-06-21 DIAGNOSIS — M54.16 RIGHT LUMBAR RADICULITIS: ICD-10-CM

## 2022-06-21 NOTE — LETTER
[address] [facility]    Please Schedule the following with: Yoni Rivero Date:  6/21/22     Patient: Rosario Apley     YOB: 1975    Patient Home Phone: [phone] (home)    Diagnosis: L5-S1 DDD with by foraminal stenosis, chronic low back pain, bilateral lumbar radiculitis    []LT     [x]RT     []KINJAL     []Midline    Levels: L5-S1 #1    []Cervical DIANNE 81310, 84895  []L-MBB 59744, 99576  []SI Joint 15873   []C-FACET 77933, 90125, 29555  []L-FACET Q116716, 21033  [x]Interlaminar DIANNE 37589     []HIP 89450    []C-MBB  []Transforaminal DIANNE 63504  []Neurotomy 10082, 96644, 27290    Attending Provider: Yin Lira PA-C    Injection Schedule for: At:  [facility]   First Insurance:                                               Pre-cert #:  Second Insurance:                 Pre-cert #:    Comments: Follow up with BEATRIZ Garcia 2weeks after procedure: [phone]    SEDATION:       [] IV           [] ORAL     [] Blood Thinner:                 []Diabetic           []Antibiotic:               []Glaucoma:    [] Pacemaker/defib       [] Current Open Wounds, Lacerations or Sores     Allergies:    Allergies   Allergen Reactions    Latex Hives and Swelling      blisters     Avocado Swelling    Banana     Thimerosal Swelling     A preservative       Past Medical History:   Diagnosis Date    Abnormal Pap smear of cervix 1995    Leep     Anemia     child     Asthma     child martines     BPPV (benign paroxysmal positional vertigo)     GERD (gastroesophageal reflux disease)     History of blood transfusion     5 yrs of age    Jewel Gomez HTN (hypertension) 2005    IUD migration, initial encounter 12/4/2015    Migraines     Pap smear abnormality of cervix with ASCUS favoring benign 12/16/2013    Overview:  ASCUS HPV negative-co test in 3 years (2016)    PVC (premature ventricular contraction)     Stress incontinence           Current Outpatient Medications:     methocarbamol (ROBAXIN-750) 750 MG tablet, I po BID PRN, Disp: 60 tablet, Rfl: 2    predniSONE (DELTASONE) 10 MG tablet, SIG: iii po BID x 2 days then ii po BID x 2 days then i po BID x 2 days then i po qd x 2 days, Disp: 26 tablet, Rfl: 0    losartan (COZAAR) 50 MG tablet, TAKE 1 TABLET BY MOUTH ONE TIME A DAY, Disp: 90 tablet, Rfl: 0    metoprolol succinate (TOPROL XL) 50 MG extended release tablet, TAKE 1 TABLET BY MOUTH ONE TIME A DAY, Disp: 90 tablet, Rfl: 0    hydroCHLOROthiazide (HYDRODIURIL) 25 MG tablet, Take 1 tablet by mouth every morning, Disp: 90 tablet, Rfl: 3    esomeprazole Magnesium (NEXIUM) 20 MG PACK, Take 20 mg by mouth daily , Disp: , Rfl:     fluticasone (VERAMYST) 27.5 MCG/SPRAY nasal spray, 2 sprays by Each Nostril route daily, Disp: , Rfl:     cetirizine (ZYRTEC) 10 MG tablet, Take 10 mg by mouth, Disp: , Rfl:

## 2022-06-21 NOTE — TELEPHONE ENCOUNTER
I reviewed lumbar MRI results OTP with patient from 6/17/2022 showing L5-S1 DDD with mild bilateral foraminal stenosis. Multilevel lumbar facet arthropathy/synovitis particularly on the right at L5-S1. There is no high-grade central or high-grade foraminal stenosis. She still reports chronic low back and bilateral radiating leg pain. We discussed proceeding with right L5-S1 LESI #1. Procedure risk and benefits were discussed. We will order with Dr. Cole Traylor. I will see her back in the office 2 weeks after the procedure. Voiced understanding.       Gladis Langston PA-C

## 2022-06-29 DIAGNOSIS — I10 HYPERTENSION, UNSPECIFIED TYPE: ICD-10-CM

## 2022-06-29 RX ORDER — METOPROLOL SUCCINATE 50 MG/1
TABLET, EXTENDED RELEASE ORAL
Qty: 90 TABLET | Refills: 0 | Status: SHIPPED | OUTPATIENT
Start: 2022-06-29 | End: 2022-10-26

## 2022-06-29 NOTE — TELEPHONE ENCOUNTER
Refill Request     Last Seen: Last Seen Department: 2/4/2022  Last Seen by PCP: 2/4/2022    Last Written: 04/07/2022  #90 W/ 1 Refill     Next Appointment:   No future appointments.         Requested Prescriptions     Pending Prescriptions Disp Refills    metoprolol succinate (TOPROL XL) 50 MG extended release tablet [Pharmacy Med Name: METOPROLOL SUCCINATE ER 50MG TB24] 90 tablet 0     Sig: TAKE 1 TABLET BY MOUTH ONE TIME A DAY

## 2022-08-18 ENCOUNTER — HOSPITAL ENCOUNTER (OUTPATIENT)
Age: 47
Setting detail: OUTPATIENT SURGERY
Discharge: HOME | End: 2022-08-18
Attending: PAIN MEDICINE | Admitting: PAIN MEDICINE
Payer: COMMERCIAL

## 2022-08-18 VITALS
HEART RATE: 80 BPM | BODY MASS INDEX: 39.99 KG/M2 | HEIGHT: 69 IN | WEIGHT: 270 LBS | OXYGEN SATURATION: 100 % | SYSTOLIC BLOOD PRESSURE: 170 MMHG | TEMPERATURE: 98 F | RESPIRATION RATE: 18 BRPM | DIASTOLIC BLOOD PRESSURE: 101 MMHG

## 2022-08-18 PROBLEM — M54.16 LUMBAR RADICULOPATHY: Status: ACTIVE | Noted: 2022-08-18

## 2022-08-18 LAB — PREGNANCY, URINE: NEGATIVE

## 2022-08-18 PROCEDURE — 7100000010 HC PHASE II RECOVERY - FIRST 15 MIN: Performed by: PAIN MEDICINE

## 2022-08-18 PROCEDURE — 6360000004 HC RX CONTRAST MEDICATION: Performed by: PAIN MEDICINE

## 2022-08-18 PROCEDURE — 6360000002 HC RX W HCPCS: Performed by: PAIN MEDICINE

## 2022-08-18 PROCEDURE — 62323 NJX INTERLAMINAR LMBR/SAC: CPT | Performed by: PAIN MEDICINE

## 2022-08-18 PROCEDURE — 84703 CHORIONIC GONADOTROPIN ASSAY: CPT

## 2022-08-18 PROCEDURE — 3600000002 HC SURGERY LEVEL 2 BASE: Performed by: PAIN MEDICINE

## 2022-08-18 PROCEDURE — 3600000012 HC SURGERY LEVEL 2 ADDTL 15MIN: Performed by: PAIN MEDICINE

## 2022-08-18 PROCEDURE — 2709999900 HC NON-CHARGEABLE SUPPLY: Performed by: PAIN MEDICINE

## 2022-08-18 PROCEDURE — 2500000003 HC RX 250 WO HCPCS: Performed by: PAIN MEDICINE

## 2022-08-18 RX ORDER — BETAMETHASONE SODIUM PHOSPHATE AND BETAMETHASONE ACETATE 3; 3 MG/ML; MG/ML
INJECTION, SUSPENSION INTRA-ARTICULAR; INTRALESIONAL; INTRAMUSCULAR; SOFT TISSUE
Status: DISCONTINUED
Start: 2022-08-18 | End: 2022-08-18 | Stop reason: HOSPADM

## 2022-08-18 RX ORDER — LIDOCAINE HYDROCHLORIDE 10 MG/ML
INJECTION, SOLUTION EPIDURAL; INFILTRATION; INTRACAUDAL; PERINEURAL PRN
Status: DISCONTINUED | OUTPATIENT
Start: 2022-08-18 | End: 2022-08-18 | Stop reason: ALTCHOICE

## 2022-08-18 ASSESSMENT — PAIN - FUNCTIONAL ASSESSMENT
PAIN_FUNCTIONAL_ASSESSMENT: 0-10
PAIN_FUNCTIONAL_ASSESSMENT: PREVENTS OR INTERFERES SOME ACTIVE ACTIVITIES AND ADLS

## 2022-08-18 NOTE — DISCHARGE INSTRUCTIONS
[address] [facility] [address]                    [phone]                        Post Pain Management Injection                                          1)  PATIENT INSTRUCTIONS: - Resume Normal Diet         - Other                         2)  ACTIVITY: * No driving or operating machinery for 8 hours post procedure without sedation      and 24 hours with sedation. If you are seen driving during this time the        proper authorities will be notified. * Do not stay alone for 4-6 hours after the procedure       * Do not sign legal documents, make any major decisions, or be involved in       work decisions for the remainder of the day. - May shower or bathe          - Resume normal activity when full movement/sensation has       returned in extremities                   3)  SITE CARE: - Observe puncture site for signs of infection (redness, warmth       swelling, drainage with a foul odor, fever or increased tenderness)                4)  EXPECTED SIDE EFFECTS: * Numbness/tingling/weakness in extremities, if this lasts more       than 6 hours notify Dr. Ludmila Jackman        - Muscle stiffness, soreness at puncture site (soreness may       last 2-4 days)                    5)  DIABETIC PATIENTS ONLY    - Increased glucose levels in all diabetic patients who have received       a steroid injection.               - Monitor blood sugars frequently for the first 5 days following procedure. Adjust medication accordingly. 6)  TO REACH DR. Obi Jacob: - Call [phone]                    7)  ADDITIONAL INSTRUCTIONS: - Follow-up as scheduled or call for appointment if not already done. - Patients taking Coumadin may resume taking as before the procedure.                  I have received and understand these instructions                     _____________________________        ___________________________      ________     ________ _____________________________        ___________________________      ________     ________                                      _________      _________

## 2022-08-18 NOTE — PROGRESS NOTES
Discharge  instructions reviewed. Pt and family verbalize understanding with no further questions. VSS. Pt discharged via [address] to car. Assessment unchanged. toe pain

## 2022-08-18 NOTE — PROCEDURES
Loreta Jacobsen is a 56 y.o. female patient. No diagnosis found. Past Medical History:   Diagnosis Date    Abnormal Pap smear of cervix 1995    Leep     Anemia     child     Asthma     child martines     BPPV (benign paroxysmal positional vertigo)     GERD (gastroesophageal reflux disease)     History of blood transfusion     5 yrs of age     HTN (hypertension) 2005    IUD migration, initial encounter 12/4/2015    Migraines     Pap smear abnormality of cervix with ASCUS favoring benign 12/16/2013    Overview:  ASCUS HPV negative-co test in 3 years (2016)    PVC (premature ventricular contraction)     Stress incontinence      Blood pressure (!) 157/102, pulse 94, temperature 98.4 °F (36.9 °C), temperature source Temporal, resp. rate 16, height 5' 9\" (1.753 m), weight 270 lb (122.5 kg), last menstrual period 07/23/2022, SpO2 100 %, not currently breastfeeding. Procedures    Anna Pinto MD  8/18/2022    LUMBAR INTERLAMINAR EPIDURAL INJECTION AT   L   L5S1     LEVEL. 31741 with 76651  INDICATIONS:  Lumbar Radiculitis 724.4, M54.16    OPERATIVE PROCEDURE:  Consent was signed by the patient after the risks and benefits were explained. With the patient in the prone position, the back was prepped with Prevail and draped. Aseptic technique was used at every stage of the procedure. Skin infiltration was with 0.5 cc of 1% Lidocaine followed by placement of an _ 17__-gauge Touhy needle under fluoroscopic guidance in the epidural space which was maximised by fluoroscopic angulation. Aspiration was negative for CSF or blood . This was followed by injection of 2__cc of _LIDO  with _9 mg of CELESTONE  The needle was pulled out intact. The patient tolerated the procedure well and discharge instructions were given. Appropriate dye spread was seen in both AP and Oblique views. ESTIMATED BLOOD LOSS:  Less than 1 cc      Pulse Ox Monitoring was done. Omnipaque dye was / was not injected.   Lateral view was / was not checked.

## 2022-10-26 DIAGNOSIS — I10 HYPERTENSION, UNSPECIFIED TYPE: ICD-10-CM

## 2022-10-26 RX ORDER — METOPROLOL SUCCINATE 50 MG/1
TABLET, EXTENDED RELEASE ORAL
Qty: 90 TABLET | Refills: 0 | Status: SHIPPED | OUTPATIENT
Start: 2022-10-26

## 2022-10-26 NOTE — TELEPHONE ENCOUNTER
Refill Request       Last Seen: Last Seen Department: 2/4/2022  Last Seen by PCP: Visit date not found    Last Written: 06/29/2022    Next Appointment:   Future Appointments   Date Time Provider Ramy Lugo   11/1/2022  3:45 PM JUDI Shahid             Requested Prescriptions     Pending Prescriptions Disp Refills    metoprolol succinate (TOPROL XL) 50 MG extended release tablet [Pharmacy Med Name: METOPROLOL SUCCINATE ER 50MG TB24] 90 tablet 0     Sig: TAKE 1 TABLET BY MOUTH ONE TIME A DAY

## 2022-11-01 ENCOUNTER — OFFICE VISIT (OUTPATIENT)
Dept: FAMILY MEDICINE CLINIC | Age: 47
End: 2022-11-01
Payer: COMMERCIAL

## 2022-11-01 VITALS
SYSTOLIC BLOOD PRESSURE: 106 MMHG | HEIGHT: 69 IN | WEIGHT: 272.6 LBS | BODY MASS INDEX: 40.38 KG/M2 | HEART RATE: 101 BPM | OXYGEN SATURATION: 97 % | DIASTOLIC BLOOD PRESSURE: 74 MMHG

## 2022-11-01 DIAGNOSIS — I10 HYPERTENSION, UNSPECIFIED TYPE: ICD-10-CM

## 2022-11-01 DIAGNOSIS — M25.50 HYPERMOBILITY ARTHRALGIA: Primary | ICD-10-CM

## 2022-11-01 DIAGNOSIS — K21.9 GASTROESOPHAGEAL REFLUX DISEASE WITHOUT ESOPHAGITIS: ICD-10-CM

## 2022-11-01 PROCEDURE — 99214 OFFICE O/P EST MOD 30 MIN: CPT | Performed by: PHYSICIAN ASSISTANT

## 2022-11-01 PROCEDURE — 3078F DIAST BP <80 MM HG: CPT | Performed by: PHYSICIAN ASSISTANT

## 2022-11-01 PROCEDURE — 3074F SYST BP LT 130 MM HG: CPT | Performed by: PHYSICIAN ASSISTANT

## 2022-11-01 RX ORDER — LOSARTAN POTASSIUM 50 MG/1
TABLET ORAL
Qty: 90 TABLET | Refills: 1 | Status: SHIPPED | OUTPATIENT
Start: 2022-11-01

## 2022-11-01 RX ORDER — LOSARTAN POTASSIUM 50 MG/1
50 TABLET ORAL DAILY
Qty: 90 TABLET | Refills: 0 | Status: CANCELLED | OUTPATIENT
Start: 2022-11-01

## 2022-11-01 RX ORDER — OLOPATADINE HYDROCHLORIDE 2 MG/ML
SOLUTION/ DROPS OPHTHALMIC
COMMUNITY
Start: 2022-07-01

## 2022-11-01 RX ORDER — LOSARTAN POTASSIUM 50 MG/1
TABLET ORAL
Qty: 7 TABLET | Refills: 0 | Status: SHIPPED | OUTPATIENT
Start: 2022-11-01 | End: 2022-11-01 | Stop reason: SDUPTHER

## 2022-11-01 SDOH — HEALTH STABILITY: PHYSICAL HEALTH: ON AVERAGE, HOW MANY MINUTES DO YOU ENGAGE IN EXERCISE AT THIS LEVEL?: 0 MIN

## 2022-11-01 SDOH — HEALTH STABILITY: PHYSICAL HEALTH: ON AVERAGE, HOW MANY DAYS PER WEEK DO YOU ENGAGE IN MODERATE TO STRENUOUS EXERCISE (LIKE A BRISK WALK)?: 0 DAYS

## 2022-11-01 ASSESSMENT — PATIENT HEALTH QUESTIONNAIRE - PHQ9
1. LITTLE INTEREST OR PLEASURE IN DOING THINGS: 0
SUM OF ALL RESPONSES TO PHQ QUESTIONS 1-9: 0
2. FEELING DOWN, DEPRESSED OR HOPELESS: 0
SUM OF ALL RESPONSES TO PHQ9 QUESTIONS 1 & 2: 0

## 2022-11-01 ASSESSMENT — SOCIAL DETERMINANTS OF HEALTH (SDOH)
WITHIN THE LAST YEAR, HAVE YOU BEEN HUMILIATED OR EMOTIONALLY ABUSED IN OTHER WAYS BY YOUR PARTNER OR EX-PARTNER?: YES
WITHIN THE LAST YEAR, HAVE YOU BEEN KICKED, HIT, SLAPPED, OR OTHERWISE PHYSICALLY HURT BY YOUR PARTNER OR EX-PARTNER?: NO
WITHIN THE LAST YEAR, HAVE YOU BEEN AFRAID OF YOUR PARTNER OR EX-PARTNER?: NO
WITHIN THE LAST YEAR, HAVE TO BEEN RAPED OR FORCED TO HAVE ANY KIND OF SEXUAL ACTIVITY BY YOUR PARTNER OR EX-PARTNER?: NO

## 2022-11-01 ASSESSMENT — ENCOUNTER SYMPTOMS: RESPIRATORY NEGATIVE: 1

## 2022-11-01 NOTE — PROGRESS NOTES
Subjective:      Patient ID: Annel Lopez is a 56 y.o. female. HPI    Patient here to establish care. She is overall felling well. She is tolerating medications without issue. Will need refills today. She reports she believes she has EDS. Sister with it, was told by a physician in the past it was likely but never had work up. She reports chronic joint and muscle pains. History of hypermobility and dislocations. Would like referral for work up. I have reviewed the patient's medical history in detail and updated the computerized patient record. Review of Systems   Constitutional: Negative. Respiratory: Negative. Cardiovascular: Negative. Musculoskeletal:  Positive for arthralgias and myalgias. Neurological: Negative. Objective:   Physical Exam  Constitutional:       Appearance: Normal appearance. She is obese. Cardiovascular:      Rate and Rhythm: Normal rate and regular rhythm. Heart sounds: Normal heart sounds. Pulmonary:      Effort: Pulmonary effort is normal.      Breath sounds: Normal breath sounds. Neurological:      General: No focal deficit present. Mental Status: She is alert and oriented to person, place, and time. Assessment/Plan       Diagnosis Orders   1. Hypermobility arthralgia  Rebecca Chandra MD, Rheumatology, [facility]      2. Hypertension, unspecified type  losartan (COZAAR) 50 MG tablet and hctz 25mg daily          3.  Gastroesophageal reflux disease without esophagitis  Continue nexium 20mg daily                JUDI Curtis

## 2022-11-22 NOTE — PROGRESS NOTES
[address], MD                                                           [address] [place] [address] 51, [address]                                                             [phone] (P) [phone] (F)      Note is transcribed using voice recognition software. Inadvertent computerized transcription errors may be present. Patient identification: Robel Emerson, female : 1975,47 y.o.        ASSESSMENT AND PLAN:  Tiffanie Bolivar was seen today for establish care and joint swelling. 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 History of joint subluxations-shoulders, hips, knees, and ankle hypermobility. Chronic musculoskeletal discomfort-noninflammatory from secondary osteoarthritis and hypermobility. No other extra-articular manifestations. Plan-  Explained diagnosis, management options. Pain control, increase functional capacity, strength building is the goal.  She has trouble doing ground exercises because of secondary osteoarthritis and multiple subluxating joints in the past, aquatic therapy would be most reasonable. Encouraged patient to utilize water exercises frequently, has become challenging with her hectic work hours. In terms of pain control-Cymbalta might help to reduce the need of chronic Aleve use. Side effects, directions, monitoring explained. Recommend starting 20 mg daily, if tolerated, can be increased to 30 mg a day.   Patient is advised to call me in couple weeks about the refill. She is followed by pain management, tends to get intermittent joint injections/back injections. She is aware of the benefit of optimal weight. Echo from 2011-normal aortic arch and large blood vessels. Call with any questions or concerns, follow-up in 4 months. Patient indicates understanding and agrees with the management plan. #################################################################################################      REASON FOR CONSULTATION:  Patient is being seen at the request of  JUDI Purdy / JUDI Carranza for evaluation of joint hypermobility. HISTORY OF PRESENT ILLNESS:  56 y.o. female with systemic hypertension, generalized hypermobility-history of multiple joint dislocations and subluxations-bilateral shoulder dislocations-had reconstructive surgery, hip subluxations, patellar tracking, pes planus and pronation of her feet states that she has been extremely hypomobile since she was a child. She was in the [employer] for about 10 years did a lot of physical workouts/physical obligations that made her symptoms worse especially shoulders. She reports worsening chronic musculoskeletal pain throughout her body and upper, lower extremities, trunk-migratory fashion but never pain-free. Rest makes it worse, physical activity makes it worse, states that she just cannot find the right balance. She sees spine, has been getting intermittent back injections. She does not have any swollen joints at this time, does report intermittent swelling in peripheral joints as well. Patient is frustrated with chronic pain and not able to do what she enjoys doing in her life. Warmer weather makes her symptoms better, thinking of moving to [place] after her daughter graduates. In terms of pain control, she has been taking 2 Aleve's twice a day long-term, and PPI. No history of GI bleed.   She denies skin hyperextensibility, hypertrophic scars.  Denies hypotensive spells. All other review of systems are negative. PMH, PSH,Social history , Meds reviewed. FH- biologics father - hypermobile- 1/2 sister from Dad sister       PHYSICAL EXAM:    Vitals:    /80   Ht 5' 9\" (1.753 m)   Wt 272 lb (123.4 kg)   BMI 40.17 kg/m²   AA)x3 well nourished, and well groomed, normal judgement. MKS:        Beighton score for joint hypermobility    Ability to: Left  Right   Passively dorsiflex the fifth metacarpophalangeal joint by at least 90 degrees 1     Oppose the thumb to the volar aspect of the ipsilateral forearm 1  1   Hyperextend the elbow by at least 10 degress      Hyperextend the knee by at least 10 degrees      Place the hands flat on the floor without bending the knees  1      Total score   4 /9    >4 or more/ 9 = generalized joint hypermobility. Widespread pain areas positive  Secondary osteoarthritic changes in her shoulders, knees. Pes planus deformity bilaterally. Skin: No rashes, no induration or skin thickening or nodules. No hypertrophic scars. HEENT: Normal lids, lacrimal glands and pupils. No oral or nasal ulcers. Salivary glands reveal no evidence of abnormality. External inspection of the ears and nose within normal limits. Supple adenopathy. No edema.     DATA:   Lab Results   Component Value Date    WBC 8.9 12/23/2019    HGB 13.6 12/23/2019    HCT 40.6 12/23/2019    MCV 85.5 12/23/2019     12/23/2019     Lab Results   Component Value Date     01/03/2022    K 3.9 01/03/2022     01/03/2022    CO2 25 01/03/2022    BUN 13 01/03/2022    CREATININE 0.6 01/03/2022    GLUCOSE 101 (H) 01/03/2022    CALCIUM 9.5 01/03/2022    PROT 7.4 01/03/2022    LABALBU 4.5 01/03/2022    BILITOT 0.4 01/03/2022    ALKPHOS 111 01/03/2022    AST 11 (L) 01/03/2022    ALT 10 01/03/2022    LABGLOM >60 01/03/2022    GFRAA >60 01/03/2022    AGRATIO 1.6 01/03/2022    GLOB 2.8 12/23/2019           Total time >60 minutes that includes the following-  Preparing to see the patient such as reviewing patients records, pre-charting, preparing the visit on the same day, performing a medically appropriate history and physical examination, counseling and educating patient about diagnosis, management plan, ordering appropriate testings, prescriptions, communicating findings to other care providers, and documenting clinical information in electronic medical record. I thank you for giving me the opportunity to be involved in [facility] care and I look forward following Aristides Alcocer along with you. If you have any questions or concerns please feel free to contact me at any time.   Javier Jovel MD 11/23/22

## 2022-11-23 ENCOUNTER — OFFICE VISIT (OUTPATIENT)
Dept: RHEUMATOLOGY | Age: 47
End: 2022-11-23
Payer: COMMERCIAL

## 2022-11-23 VITALS
DIASTOLIC BLOOD PRESSURE: 80 MMHG | HEIGHT: 69 IN | SYSTOLIC BLOOD PRESSURE: 120 MMHG | WEIGHT: 272 LBS | BODY MASS INDEX: 40.29 KG/M2

## 2022-11-23 DIAGNOSIS — M25.50 HYPERMOBILITY ARTHRALGIA: Primary | ICD-10-CM

## 2022-11-23 PROCEDURE — 3074F SYST BP LT 130 MM HG: CPT | Performed by: INTERNAL MEDICINE

## 2022-11-23 PROCEDURE — 3078F DIAST BP <80 MM HG: CPT | Performed by: INTERNAL MEDICINE

## 2022-11-23 PROCEDURE — 99204 OFFICE O/P NEW MOD 45 MIN: CPT | Performed by: INTERNAL MEDICINE

## 2022-11-23 RX ORDER — DULOXETIN HYDROCHLORIDE 20 MG/1
CAPSULE, DELAYED RELEASE ORAL
Qty: 30 CAPSULE | Refills: 0 | Status: SHIPPED | OUTPATIENT
Start: 2022-11-23

## 2023-01-05 DIAGNOSIS — G89.29 CHRONIC SI JOINT PAIN: ICD-10-CM

## 2023-01-05 DIAGNOSIS — M53.3 CHRONIC SI JOINT PAIN: ICD-10-CM

## 2023-01-05 DIAGNOSIS — M54.50 LUMBAR PAIN: ICD-10-CM

## 2023-01-05 DIAGNOSIS — M51.36 DDD (DEGENERATIVE DISC DISEASE), LUMBAR: ICD-10-CM

## 2023-01-06 ENCOUNTER — OFFICE VISIT (OUTPATIENT)
Dept: FAMILY MEDICINE CLINIC | Age: 48
End: 2023-01-06

## 2023-01-06 VITALS
OXYGEN SATURATION: 97 % | WEIGHT: 276.6 LBS | SYSTOLIC BLOOD PRESSURE: 124 MMHG | HEIGHT: 69 IN | DIASTOLIC BLOOD PRESSURE: 84 MMHG | BODY MASS INDEX: 40.97 KG/M2 | HEART RATE: 95 BPM

## 2023-01-06 DIAGNOSIS — M54.16 LUMBAR RADICULOPATHY: ICD-10-CM

## 2023-01-06 DIAGNOSIS — Z00.00 ANNUAL PHYSICAL EXAM: Primary | ICD-10-CM

## 2023-01-06 LAB
A/G RATIO: 1.4 (ref 1.1–2.2)
ALBUMIN SERPL-MCNC: 4.3 G/DL (ref 3.4–5)
ALP BLD-CCNC: 101 U/L (ref 40–129)
ALT SERPL-CCNC: 12 U/L (ref 10–40)
ANION GAP SERPL CALCULATED.3IONS-SCNC: 15 MMOL/L (ref 3–16)
AST SERPL-CCNC: 15 U/L (ref 15–37)
BILIRUB SERPL-MCNC: 0.3 MG/DL (ref 0–1)
BUN BLDV-MCNC: 17 MG/DL (ref 7–20)
CALCIUM SERPL-MCNC: 9.2 MG/DL (ref 8.3–10.6)
CHLORIDE BLD-SCNC: 102 MMOL/L (ref 99–110)
CHOLESTEROL, TOTAL: 183 MG/DL (ref 0–199)
CO2: 22 MMOL/L (ref 21–32)
CREAT SERPL-MCNC: 0.6 MG/DL (ref 0.6–1.1)
GFR SERPL CREATININE-BSD FRML MDRD: >60 ML/MIN/{1.73_M2}
GLUCOSE BLD-MCNC: 117 MG/DL (ref 70–99)
HCT VFR BLD CALC: 38.1 % (ref 36–48)
HDLC SERPL-MCNC: 41 MG/DL (ref 40–60)
HEMOGLOBIN: 12.2 G/DL (ref 12–16)
LDL CHOLESTEROL CALCULATED: 118 MG/DL
MCH RBC QN AUTO: 26.4 PG (ref 26–34)
MCHC RBC AUTO-ENTMCNC: 32.1 G/DL (ref 31–36)
MCV RBC AUTO: 82.1 FL (ref 80–100)
PDW BLD-RTO: 14.3 % (ref 12.4–15.4)
PLATELET # BLD: 280 K/UL (ref 135–450)
PLATELET SLIDE REVIEW: ABNORMAL
PMV BLD AUTO: 8.7 FL (ref 5–10.5)
POTASSIUM SERPL-SCNC: 4.2 MMOL/L (ref 3.5–5.1)
RBC # BLD: 4.64 M/UL (ref 4–5.2)
SODIUM BLD-SCNC: 139 MMOL/L (ref 136–145)
TOTAL PROTEIN: 7.4 G/DL (ref 6.4–8.2)
TRIGL SERPL-MCNC: 121 MG/DL (ref 0–150)
VLDLC SERPL CALC-MCNC: 24 MG/DL
WBC # BLD: 12.2 K/UL (ref 4–11)

## 2023-01-06 RX ORDER — METHYLPREDNISOLONE 4 MG/1
TABLET ORAL
Qty: 1 KIT | Refills: 0 | Status: SHIPPED | OUTPATIENT
Start: 2023-01-06 | End: 2023-01-12
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
## 2023-02-20 ENCOUNTER — PATIENT MESSAGE (OUTPATIENT)
Dept: FAMILY MEDICINE CLINIC | Age: 48
End: 2023-02-20

## 2023-02-20 DIAGNOSIS — I10 HYPERTENSION, UNSPECIFIED TYPE: ICD-10-CM

## 2023-02-20 RX ORDER — METOPROLOL SUCCINATE 50 MG/1
TABLET, EXTENDED RELEASE ORAL
Qty: 90 TABLET | Refills: 0 | OUTPATIENT
Start: 2023-02-20

## 2023-02-20 RX ORDER — METOPROLOL SUCCINATE 50 MG/1
50 TABLET, EXTENDED RELEASE ORAL DAILY
Qty: 90 TABLET | Refills: 1 | Status: SHIPPED | OUTPATIENT
Start: 2023-02-20

## 2023-02-20 NOTE — TELEPHONE ENCOUNTER
Rome Pardo is requesting refill(s) metoprolol  Last OV 1/6/23 (pertaining to medication)  LR 10/26/22 (per medication requested)  Next office visit scheduled or attempted No   If no, reason:

## 2023-02-20 NOTE — TELEPHONE ENCOUNTER
From: Chico Daniel  To: Shania Vasques  Sent: 2/20/2023 12:58 PM EST  Subject: Med denial     Good afternoon, I have my medication on auto refill with harness and got a message that the metoprolol was denied.  I am not sure why though

## 2023-03-30 DIAGNOSIS — I10 PRIMARY HYPERTENSION: ICD-10-CM

## 2023-03-30 DIAGNOSIS — I10 UNCONTROLLED HYPERTENSION: ICD-10-CM

## 2023-03-30 DIAGNOSIS — R60.0 EDEMA OF LOWER EXTREMITY: ICD-10-CM

## 2023-03-30 RX ORDER — HYDROCHLOROTHIAZIDE 25 MG/1
TABLET ORAL
Qty: 90 TABLET | Refills: 3 | OUTPATIENT
Start: 2023-03-30

## 2023-03-30 NOTE — TELEPHONE ENCOUNTER
Greg Blair [phone] (home)    is requesting refill(s) of medication Hydrochlorothiazide to preferred pharmacy [facility] 1/6/23  (pertaining to medication)   Last refill 4/6/22 (per medication requested)  Next office visit scheduled or attempted No  Date   If No, reason

## 2023-03-31 RX ORDER — HYDROCHLOROTHIAZIDE 25 MG/1
25 TABLET ORAL EVERY MORNING
Qty: 90 TABLET | Refills: 1 | Status: SHIPPED | OUTPATIENT
Start: 2023-03-31

## 2023-04-21 ENCOUNTER — HOSPITAL ENCOUNTER (OUTPATIENT)
Age: 48
Discharge: HOME | End: 2023-04-21
Payer: COMMERCIAL

## 2023-04-21 DIAGNOSIS — Z83.2 FAMILY HISTORY OF VON WILLEBRAND DISEASE: Primary | ICD-10-CM

## 2023-04-21 DIAGNOSIS — Z83.2 FAMILY HISTORY OF VON WILLEBRAND DISEASE: ICD-10-CM

## 2023-04-21 PROCEDURE — 85240 CLOT FACTOR VIII AHG 1 STAGE: CPT

## 2023-04-21 PROCEDURE — 85245 CLOT FACTOR VIII VW RISTOCTN: CPT

## 2023-04-21 PROCEDURE — 36415 COLL VENOUS BLD VENIPUNCTURE: CPT

## 2023-04-21 PROCEDURE — 85246 CLOT FACTOR VIII VW ANTIGEN: CPT

## 2023-04-23 DIAGNOSIS — I10 HYPERTENSION, UNSPECIFIED TYPE: ICD-10-CM

## 2023-04-23 LAB
FACT VIII ACT/NOR PPP: 122 % (ref 56–191)
VWF AG ACT/NOR PPP IA: 114 % (ref 52–214)
VWF:RCO ACT/NOR PPP PL AGG: 78 % (ref 51–215)

## 2023-04-24 RX ORDER — LOSARTAN POTASSIUM 50 MG/1
TABLET ORAL
Qty: 90 TABLET | Refills: 1 | Status: SHIPPED | OUTPATIENT
Start: 2023-04-24

## 2023-04-24 NOTE — TELEPHONE ENCOUNTER
Winnie Parrish [phone] (home)    is requesting refill(s) of medication Losartan to preferred pharmacy [facility] 1/6/23 (pertaining to medication)   Last refill 11/1/22 (per medication requested)  Next office visit scheduled or attempted No  Date   If No, reason

## 2023-04-25 DIAGNOSIS — I10 HYPERTENSION, UNSPECIFIED TYPE: ICD-10-CM

## 2023-04-25 RX ORDER — LOSARTAN POTASSIUM 50 MG/1
TABLET ORAL
Qty: 90 TABLET | Refills: 1 | OUTPATIENT
Start: 2023-04-25

## 2023-04-26 LAB — FACT VIII ACT/NOR PPP: 147 % (ref 50–150)

## 2023-05-18 DIAGNOSIS — M54.50 LUMBAR PAIN: ICD-10-CM

## 2023-05-18 DIAGNOSIS — M54.16 RIGHT LUMBAR RADICULITIS: ICD-10-CM

## 2023-05-18 DIAGNOSIS — M53.3 CHRONIC SI JOINT PAIN: Primary | ICD-10-CM

## 2023-05-18 DIAGNOSIS — M51.36 DDD (DEGENERATIVE DISC DISEASE), LUMBAR: ICD-10-CM

## 2023-05-18 DIAGNOSIS — G89.29 CHRONIC SI JOINT PAIN: Primary | ICD-10-CM

## 2023-05-18 RX ORDER — METHYLPREDNISOLONE 4 MG/1
TABLET ORAL
Qty: 1 KIT | Refills: 0 | Status: SHIPPED | OUTPATIENT
Start: 2023-05-18

## 2023-06-03 SDOH — HEALTH STABILITY: PHYSICAL HEALTH: ON AVERAGE, HOW MANY DAYS PER WEEK DO YOU ENGAGE IN MODERATE TO STRENUOUS EXERCISE (LIKE A BRISK WALK)?: 3 DAYS

## 2023-06-03 ASSESSMENT — SOCIAL DETERMINANTS OF HEALTH (SDOH)
WITHIN THE LAST YEAR, HAVE TO BEEN RAPED OR FORCED TO HAVE ANY KIND OF SEXUAL ACTIVITY BY YOUR PARTNER OR EX-PARTNER?: NO
WITHIN THE LAST YEAR, HAVE YOU BEEN KICKED, HIT, SLAPPED, OR OTHERWISE PHYSICALLY HURT BY YOUR PARTNER OR EX-PARTNER?: NO
WITHIN THE LAST YEAR, HAVE YOU BEEN AFRAID OF YOUR PARTNER OR EX-PARTNER?: NO
WITHIN THE LAST YEAR, HAVE YOU BEEN HUMILIATED OR EMOTIONALLY ABUSED IN OTHER WAYS BY YOUR PARTNER OR EX-PARTNER?: NO

## 2023-06-05 ENCOUNTER — OFFICE VISIT (OUTPATIENT)
Dept: ORTHOPEDIC SURGERY | Age: 48
End: 2023-06-05

## 2023-06-05 VITALS — WEIGHT: 273 LBS | HEIGHT: 69 IN | BODY MASS INDEX: 40.43 KG/M2

## 2023-06-05 DIAGNOSIS — M25.562 LEFT KNEE PAIN, UNSPECIFIED CHRONICITY: Primary | ICD-10-CM

## 2023-06-05 DIAGNOSIS — M22.8X2 MALTRACKING OF LEFT PATELLA: ICD-10-CM

## 2023-08-14 DIAGNOSIS — I10 HYPERTENSION, UNSPECIFIED TYPE: ICD-10-CM

## 2023-08-14 RX ORDER — METOPROLOL SUCCINATE 50 MG/1
TABLET, EXTENDED RELEASE ORAL
Qty: 90 TABLET | Refills: 0 | Status: SHIPPED | OUTPATIENT
Start: 2023-08-14

## 2023-08-14 NOTE — TELEPHONE ENCOUNTER
Miriam Gonzales [phone] (home)    is requesting refill(s) of medication Metoprolol Succinate Er 50 mg tablet to preferred pharmacy [id number] 01/06/23 (pertaining to medication)   Last refill 02/20/23 (per medication requested)  Next office visit scheduled or attempted No

## 2023-08-24 SDOH — ECONOMIC STABILITY: HOUSING INSECURITY
IN THE LAST 12 MONTHS, WAS THERE A TIME WHEN YOU DID NOT HAVE A STEADY PLACE TO SLEEP OR SLEPT IN A SHELTER (INCLUDING NOW)?: NO

## 2023-08-24 SDOH — ECONOMIC STABILITY: FOOD INSECURITY: WITHIN THE PAST 12 MONTHS, YOU WORRIED THAT YOUR FOOD WOULD RUN OUT BEFORE YOU GOT MONEY TO BUY MORE.: NEVER TRUE

## 2023-08-24 SDOH — ECONOMIC STABILITY: FOOD INSECURITY: WITHIN THE PAST 12 MONTHS, THE FOOD YOU BOUGHT JUST DIDN'T LAST AND YOU DIDN'T HAVE MONEY TO GET MORE.: NEVER TRUE

## 2023-08-24 SDOH — ECONOMIC STABILITY: INCOME INSECURITY: HOW HARD IS IT FOR YOU TO PAY FOR THE VERY BASICS LIKE FOOD, HOUSING, MEDICAL CARE, AND HEATING?: NOT VERY HARD

## 2023-08-25 ENCOUNTER — OFFICE VISIT (OUTPATIENT)
Dept: FAMILY MEDICINE CLINIC | Age: 48
End: 2023-08-25
Payer: COMMERCIAL

## 2023-08-25 VITALS
BODY MASS INDEX: 41.15 KG/M2 | DIASTOLIC BLOOD PRESSURE: 76 MMHG | HEART RATE: 78 BPM | RESPIRATION RATE: 16 BRPM | HEIGHT: 69 IN | SYSTOLIC BLOOD PRESSURE: 124 MMHG | WEIGHT: 277.8 LBS | OXYGEN SATURATION: 96 %

## 2023-08-25 DIAGNOSIS — I10 HYPERTENSION, UNSPECIFIED TYPE: ICD-10-CM

## 2023-08-25 DIAGNOSIS — M46.1 SACROILIITIS, NOT ELSEWHERE CLASSIFIED (HCC): ICD-10-CM

## 2023-08-25 DIAGNOSIS — K21.9 GASTROESOPHAGEAL REFLUX DISEASE WITHOUT ESOPHAGITIS: Primary | ICD-10-CM

## 2023-08-25 PROCEDURE — 3074F SYST BP LT 130 MM HG: CPT | Performed by: PHYSICIAN ASSISTANT

## 2023-08-25 PROCEDURE — 99214 OFFICE O/P EST MOD 30 MIN: CPT | Performed by: PHYSICIAN ASSISTANT

## 2023-08-25 PROCEDURE — 3078F DIAST BP <80 MM HG: CPT | Performed by: PHYSICIAN ASSISTANT

## 2023-08-25 RX ORDER — ESOMEPRAZOLE MAGNESIUM 20 MG/1
20 FOR SUSPENSION ORAL DAILY
Qty: 30 PACKET | Refills: 3 | Status: SHIPPED | OUTPATIENT
Start: 2023-08-25

## 2023-08-25 SDOH — ECONOMIC STABILITY: FOOD INSECURITY: WITHIN THE PAST 12 MONTHS, THE FOOD YOU BOUGHT JUST DIDN'T LAST AND YOU DIDN'T HAVE MONEY TO GET MORE.: NEVER TRUE

## 2023-08-25 SDOH — ECONOMIC STABILITY: INCOME INSECURITY: HOW HARD IS IT FOR YOU TO PAY FOR THE VERY BASICS LIKE FOOD, HOUSING, MEDICAL CARE, AND HEATING?: NOT VERY HARD

## 2023-08-25 SDOH — ECONOMIC STABILITY: FOOD INSECURITY: WITHIN THE PAST 12 MONTHS, YOU WORRIED THAT YOUR FOOD WOULD RUN OUT BEFORE YOU GOT MONEY TO BUY MORE.: NEVER TRUE

## 2023-09-14 DIAGNOSIS — R60.0 EDEMA OF LOWER EXTREMITY: ICD-10-CM

## 2023-09-14 DIAGNOSIS — I10 PRIMARY HYPERTENSION: ICD-10-CM

## 2023-09-14 DIAGNOSIS — I10 UNCONTROLLED HYPERTENSION: ICD-10-CM

## 2023-09-14 RX ORDER — HYDROCHLOROTHIAZIDE 25 MG/1
25 TABLET ORAL EVERY MORNING
Qty: 90 TABLET | Refills: 1 | Status: SHIPPED | OUTPATIENT
Start: 2023-09-14

## 2023-09-14 NOTE — TELEPHONE ENCOUNTER
Ladarius Benavidez [phone] (home)    is requesting refill(s) of medication Hydrochlorothiazide to preferred pharmacy [address] 8/25/23 (pertaining to medication)   Last refill 3/31/23 (per medication requested)  Next office visit scheduled or attempted No  Date   If No, reason

## 2023-09-25 DIAGNOSIS — I10 HYPERTENSION, UNSPECIFIED TYPE: ICD-10-CM

## 2023-09-25 RX ORDER — LOSARTAN POTASSIUM 50 MG/1
TABLET ORAL
Qty: 90 TABLET | Refills: 1 | Status: SHIPPED | OUTPATIENT
Start: 2023-09-25

## 2023-09-25 NOTE — TELEPHONE ENCOUNTER
Andi Smith [phone] (home)    is requesting refill(s) of medication Losartan 50 mg tablet to preferred pharmacy [address] 08/25/23 (pertaining to medication)   Last refill 04/24/23 (per medication requested)  Next office visit scheduled or attempted No

## 2023-11-14 DIAGNOSIS — I10 HYPERTENSION, UNSPECIFIED TYPE: ICD-10-CM

## 2023-11-14 RX ORDER — METOPROLOL SUCCINATE 50 MG/1
50 TABLET, EXTENDED RELEASE ORAL DAILY
Qty: 90 TABLET | Refills: 1 | Status: SHIPPED | OUTPATIENT
Start: 2023-11-14

## 2023-11-14 NOTE — TELEPHONE ENCOUNTER
Africa Morris [phone] (home)    is requesting refill(s) of medication Metoprolol succinate 50 mg Er tablet to preferred pharmacy [id number] 08/25/23 (pertaining to medication)   Last refill 08/14/23 (per medication requested)  Next office visit scheduled or attempted Yes  LVM for pt to call back to schedule 6 months follow up.

## 2024-01-24 ENCOUNTER — HOSPITAL ENCOUNTER (OUTPATIENT)
Dept: MAMMOGRAPHY | Age: 49
Discharge: HOME | End: 2024-01-24
Payer: COMMERCIAL

## 2024-01-24 VITALS — BODY MASS INDEX: 41.03 KG/M2 | HEIGHT: 69 IN | WEIGHT: 277 LBS

## 2024-01-24 DIAGNOSIS — Z12.31 VISIT FOR SCREENING MAMMOGRAM: ICD-10-CM

## 2024-01-24 PROCEDURE — 77063 BREAST TOMOSYNTHESIS BI: CPT

## 2024-01-24 ASSESSMENT — PATIENT HEALTH QUESTIONNAIRE - PHQ9
2. FEELING DOWN, DEPRESSED OR HOPELESS: 0
SUM OF ALL RESPONSES TO PHQ9 QUESTIONS 1 & 2: 0
SUM OF ALL RESPONSES TO PHQ QUESTIONS 1-9: 0
SUM OF ALL RESPONSES TO PHQ QUESTIONS 1-9: 0
2. FEELING DOWN, DEPRESSED OR HOPELESS: NOT AT ALL
1. LITTLE INTEREST OR PLEASURE IN DOING THINGS: 0
SUM OF ALL RESPONSES TO PHQ QUESTIONS 1-9: 0
SUM OF ALL RESPONSES TO PHQ QUESTIONS 1-9: 0
SUM OF ALL RESPONSES TO PHQ9 QUESTIONS 1 & 2: 0
1. LITTLE INTEREST OR PLEASURE IN DOING THINGS: NOT AT ALL

## 2024-01-26 ENCOUNTER — OFFICE VISIT (OUTPATIENT)
Dept: FAMILY MEDICINE CLINIC | Age: 49
End: 2024-01-26
Payer: COMMERCIAL

## 2024-01-26 VITALS
WEIGHT: 274 LBS | HEART RATE: 79 BPM | HEIGHT: 69 IN | DIASTOLIC BLOOD PRESSURE: 82 MMHG | OXYGEN SATURATION: 98 % | TEMPERATURE: 97.8 F | SYSTOLIC BLOOD PRESSURE: 130 MMHG | BODY MASS INDEX: 40.58 KG/M2

## 2024-01-26 DIAGNOSIS — M46.1 SACROILIITIS, NOT ELSEWHERE CLASSIFIED (HCC): ICD-10-CM

## 2024-01-26 DIAGNOSIS — Z00.00 ANNUAL PHYSICAL EXAM: Primary | ICD-10-CM

## 2024-01-26 PROBLEM — Z83.49 FHX: HEMOCHROMATOSIS: Status: RESOLVED | Noted: 2019-12-23 | Resolved: 2024-01-26

## 2024-01-26 PROCEDURE — 99396 PREV VISIT EST AGE 40-64: CPT | Performed by: PHYSICIAN ASSISTANT

## 2024-01-26 PROCEDURE — 3079F DIAST BP 80-89 MM HG: CPT | Performed by: PHYSICIAN ASSISTANT

## 2024-01-26 PROCEDURE — 3075F SYST BP GE 130 - 139MM HG: CPT | Performed by: PHYSICIAN ASSISTANT

## 2024-01-26 RX ORDER — LOTILANER OPHTHALMIC SOLUTION 2.5 MG/ML
1 SOLUTION/ DROPS OPHTHALMIC 2 TIMES DAILY
COMMUNITY
Start: 2024-01-19

## 2024-01-26 RX ORDER — ESOMEPRAZOLE MAGNESIUM 40 MG/1
40 CAPSULE, DELAYED RELEASE ORAL DAILY
Qty: 90 CAPSULE | Refills: 3 | Status: SHIPPED | OUTPATIENT
Start: 2024-01-26

## 2024-01-26 NOTE — PROGRESS NOTES
PAIN MANAGEMENT PROCEDURE Right 2022    RIGHT LUMBAR FIVE SACRAL ONE EPIDURAL STEROID INJECTION SITE CONFIRMED BY FLUOROSCOPY performed by José Fuentes [place] [facility]    SHOULDER ARTHROSCOPY  2005    Capsular shift    SHOULDER SURGERY Left 2001    SHOULDER SURGERY Right 2005    TONSILLECTOMY AND ADENOIDECTOMY       Family History   Problem Relation Age of Onset    Diabetes Mother     Atrial Fibrillation Mother     Thyroid Disease Mother         hashimoto    Rheum Arthritis Mother     Hemochromatosis Mother     Arthritis Mother     Breast Cancer Mother     Immune Disorder Mother         Dish    Obesity Mother     Other Mother         Hemachromatosis    Cancer Father         Mouth/ throat    Other Sister         Heds on bio dads side     Social History     Socioeconomic History    Marital status:      Spouse name: Brody    Number of children: 1    Years of education: 17    Highest education level: Not on file   Occupational History     Employer: [employer]    Occupation: orthopedics     Employer: [employer]     Comment: MA   Tobacco Use    Smoking status: Former     Current packs/day: 0.00     Average packs/day: 0.5 packs/day for 10.6 years (5.3 ttl pk-yrs)     Types: Cigarettes     Start date: 1995     Quit date: 2005     Years since quittin.5    Smokeless tobacco: Never   Vaping Use    Vaping Use: Never used   Substance and Sexual Activity    Alcohol use: Yes     Comment: 2 drinks a month    Drug use: Never    Sexual activity: Not Currently     Partners: Male     Birth control/protection: Rhythm   Other Topics Concern    Not on file   Social History Narrative    - to Brody, marriage strained at this time        -15 y/o daughter Nora        -Weight came about with hormones per patient, never went away    25 pounds with depo    30 pounds with triphasal    Once her daughter was born 15 years ago         Social Determinants of Health     Financial Resource Strain: Low Risk

## 2024-03-10 DIAGNOSIS — I10 PRIMARY HYPERTENSION: ICD-10-CM

## 2024-03-10 DIAGNOSIS — R60.0 EDEMA OF LOWER EXTREMITY: ICD-10-CM

## 2024-03-10 DIAGNOSIS — I10 UNCONTROLLED HYPERTENSION: ICD-10-CM

## 2024-03-11 RX ORDER — HYDROCHLOROTHIAZIDE 25 MG/1
25 TABLET ORAL EVERY MORNING
Qty: 90 TABLET | Refills: 1 | Status: SHIPPED | OUTPATIENT
Start: 2024-03-11

## 2024-03-11 NOTE — TELEPHONE ENCOUNTER
Montrell Douglas [phone] (home)    is requesting refill(s) of medication Hydrochlorothiazide 25 mg Tablet to preferred pharmacy [facility]    Last OV 01/26/24 (pertaining to medication)   Last refill 09/14/23 (per medication requested)  Next office visit scheduled or attempted Yes  Pt due for 6 months follow up in June. Pt does not know her schedule in June, Pt will call closer to June.

## 2024-04-03 DIAGNOSIS — I10 HYPERTENSION, UNSPECIFIED TYPE: ICD-10-CM

## 2024-04-03 NOTE — TELEPHONE ENCOUNTER
Montrell Douglas [phone] (home)    is requesting refill(s) of medication Losartan 50 mg Tablet to preferred pharmacy [facility]    Last OV 01/26/24 (pertaining to medication)   Last refill 09/25/23 (per medication requested)  Next office visit scheduled or attempted No  Due on 01/26/25

## 2024-04-04 RX ORDER — LOSARTAN POTASSIUM 50 MG/1
TABLET ORAL
Qty: 90 TABLET | Refills: 1 | Status: SHIPPED | OUTPATIENT
Start: 2024-04-04

## 2024-05-07 DIAGNOSIS — I10 HYPERTENSION, UNSPECIFIED TYPE: ICD-10-CM

## 2024-05-07 RX ORDER — METOPROLOL SUCCINATE 50 MG/1
50 TABLET, EXTENDED RELEASE ORAL DAILY
Qty: 90 TABLET | Refills: 1 | Status: SHIPPED | OUTPATIENT
Start: 2024-05-07

## 2024-05-07 NOTE — TELEPHONE ENCOUNTER
Montrell Douglas [phone] (home)    is requesting refill(s) of medication Metoprolol succinate 50 mg ER Tablet to preferred pharmacy [facility]    Last OV 01/26/24 (pertaining to medication)   Last refill 11/14/23 (per medication requested)  Next office visit scheduled or attempted Yes  Date 06/07/24

## 2024-05-15 DIAGNOSIS — M51.36 DDD (DEGENERATIVE DISC DISEASE), LUMBAR: Primary | ICD-10-CM

## 2024-05-15 RX ORDER — METHYLPREDNISOLONE 4 MG/1
TABLET ORAL
Qty: 2 KIT | Refills: 0 | Status: SHIPPED | OUTPATIENT
Start: 2024-05-15

## 2024-06-07 ENCOUNTER — OFFICE VISIT (OUTPATIENT)
Dept: FAMILY MEDICINE CLINIC | Age: 49
End: 2024-06-07
Payer: COMMERCIAL

## 2024-06-07 VITALS
WEIGHT: 276.4 LBS | BODY MASS INDEX: 40.94 KG/M2 | RESPIRATION RATE: 16 BRPM | HEART RATE: 84 BPM | OXYGEN SATURATION: 96 % | DIASTOLIC BLOOD PRESSURE: 78 MMHG | HEIGHT: 69 IN | SYSTOLIC BLOOD PRESSURE: 122 MMHG

## 2024-06-07 DIAGNOSIS — I10 HYPERTENSION, UNSPECIFIED TYPE: Primary | ICD-10-CM

## 2024-06-07 PROCEDURE — 3074F SYST BP LT 130 MM HG: CPT | Performed by: PHYSICIAN ASSISTANT

## 2024-06-07 PROCEDURE — 99213 OFFICE O/P EST LOW 20 MIN: CPT | Performed by: PHYSICIAN ASSISTANT

## 2024-06-07 PROCEDURE — 3078F DIAST BP <80 MM HG: CPT | Performed by: PHYSICIAN ASSISTANT

## 2024-06-07 ASSESSMENT — PATIENT HEALTH QUESTIONNAIRE - PHQ9
1. LITTLE INTEREST OR PLEASURE IN DOING THINGS: NOT AT ALL
SUM OF ALL RESPONSES TO PHQ QUESTIONS 1-9: 0
SUM OF ALL RESPONSES TO PHQ9 QUESTIONS 1 & 2: 0
SUM OF ALL RESPONSES TO PHQ QUESTIONS 1-9: 0
2. FEELING DOWN, DEPRESSED OR HOPELESS: NOT AT ALL
SUM OF ALL RESPONSES TO PHQ QUESTIONS 1-9: 0
SUM OF ALL RESPONSES TO PHQ QUESTIONS 1-9: 0

## 2024-06-07 NOTE — PROGRESS NOTES
Subjective:   Montrell Douglas is a 49 y.o. female with hypertension.  Current Outpatient Medications   Medication Sig Dispense Refill    metoprolol succinate (TOPROL XL) 50 MG extended release tablet TAKE ONE TABLET BY MOUTH ONCE A DAY 90 tablet 1    losartan (COZAAR) 50 MG tablet TAKE ONE TABLET BY MOUTH ONCE A DAY 90 tablet 1    hydroCHLOROthiazide (HYDRODIURIL) 25 MG tablet TAKE ONE TABLET BY MOUTH EVERY MORNING 90 tablet 1    esomeprazole (NEXIUM) 40 MG delayed release capsule Take 1 capsule by mouth daily 90 capsule 3    fluticasone (VERAMYST) 27.5 MCG/SPRAY nasal spray 2 sprays by Each Nostril route daily      cetirizine (ZYRTEC) 10 MG tablet Take 1 tablet by mouth      methylPREDNISolone (MEDROL, PATRICE,) 4 MG tablet Take one week apart (Patient not taking: Reported on 6/7/2024) 2 kit 0    XDEMVY 0.25 % SOLN Place 1 drop into both eyes in the morning and at bedtime (Patient not taking: Reported on 6/7/2024)       No current facility-administered medications for this visit.      Hypertension ROS: taking medications as instructed, no medication side effects noted, no TIA's, no chest pain on exertion, no dyspnea on exertion, no swelling of ankles.   New concerns: none today.     Objective:   /78   Pulse 84   Resp 16   Ht 1.753 m (5' 9.02\")   Wt 125.4 kg (276 lb 6.4 oz)   SpO2 96%   BMI 40.80 kg/m²    Appearance alert, well appearing, and in no distress, oriented to person, place, and time, and overweight.  General exam BP noted to be well controlled today in office, S1, S2 normal, no gallop, no murmur, chest clear, no JVD, no HSM, no edema.   Lab review: labs are reviewed, up to date and normal.     Assessment/Plan:     Diagnosis Orders   1. Hypertension, unspecified type  Well controlled on losartan/hctz.   OV 6 months fasting

## 2024-08-16 ENCOUNTER — OFFICE VISIT (OUTPATIENT)
Dept: FAMILY MEDICINE CLINIC | Age: 49
End: 2024-08-16
Payer: COMMERCIAL

## 2024-08-16 VITALS
HEIGHT: 69 IN | HEART RATE: 84 BPM | OXYGEN SATURATION: 97 % | WEIGHT: 282.8 LBS | BODY MASS INDEX: 41.89 KG/M2 | SYSTOLIC BLOOD PRESSURE: 126 MMHG | DIASTOLIC BLOOD PRESSURE: 84 MMHG | RESPIRATION RATE: 16 BRPM

## 2024-08-16 DIAGNOSIS — R30.9 URINARY PAIN: ICD-10-CM

## 2024-08-16 DIAGNOSIS — R35.0 URINE FREQUENCY: Primary | ICD-10-CM

## 2024-08-16 LAB
BILIRUBIN, POC: 0
BLOOD URINE, POC: 0
CLARITY, POC: CLEAR
COLOR, POC: YELLOW
GLUCOSE URINE, POC: 0
KETONES, POC: 0
LEUKOCYTE EST, POC: NORMAL
NITRITE, POC: 0
PH, POC: 6
PROTEIN, POC: 0
SPECIFIC GRAVITY, POC: 1.01
UROBILINOGEN, POC: 0.2

## 2024-08-16 PROCEDURE — 3079F DIAST BP 80-89 MM HG: CPT | Performed by: PHYSICIAN ASSISTANT

## 2024-08-16 PROCEDURE — 81002 URINALYSIS NONAUTO W/O SCOPE: CPT | Performed by: PHYSICIAN ASSISTANT

## 2024-08-16 PROCEDURE — 3074F SYST BP LT 130 MM HG: CPT | Performed by: PHYSICIAN ASSISTANT

## 2024-08-16 PROCEDURE — 99213 OFFICE O/P EST LOW 20 MIN: CPT | Performed by: PHYSICIAN ASSISTANT

## 2024-08-16 RX ORDER — PHENAZOPYRIDINE HYDROCHLORIDE 100 MG/1
100 TABLET, FILM COATED ORAL 3 TIMES DAILY PRN
Qty: 6 TABLET | Refills: 0 | Status: SHIPPED | OUTPATIENT
Start: 2024-08-16 | End: 2025-08-16

## 2024-08-16 RX ORDER — SULFAMETHOXAZOLE AND TRIMETHOPRIM 800; 160 MG/1; MG/1
1 TABLET ORAL 2 TIMES DAILY
Qty: 10 TABLET | Refills: 0 | Status: SHIPPED | OUTPATIENT
Start: 2024-08-16 | End: 2024-08-21

## 2024-08-16 ASSESSMENT — PATIENT HEALTH QUESTIONNAIRE - PHQ9
6. FEELING BAD ABOUT YOURSELF - OR THAT YOU ARE A FAILURE OR HAVE LET YOURSELF OR YOUR FAMILY DOWN: NOT AT ALL
SUM OF ALL RESPONSES TO PHQ QUESTIONS 1-9: 4
8. MOVING OR SPEAKING SO SLOWLY THAT OTHER PEOPLE COULD HAVE NOTICED. OR THE OPPOSITE, BEING SO FIGETY OR RESTLESS THAT YOU HAVE BEEN MOVING AROUND A LOT MORE THAN USUAL: NOT AT ALL
9. THOUGHTS THAT YOU WOULD BE BETTER OFF DEAD, OR OF HURTING YOURSELF: NOT AT ALL
SUM OF ALL RESPONSES TO PHQ9 QUESTIONS 1 & 2: 1
1. LITTLE INTEREST OR PLEASURE IN DOING THINGS: NOT AT ALL
SUM OF ALL RESPONSES TO PHQ QUESTIONS 1-9: 4
SUM OF ALL RESPONSES TO PHQ QUESTIONS 1-9: 4
2. FEELING DOWN, DEPRESSED OR HOPELESS: SEVERAL DAYS
7. TROUBLE CONCENTRATING ON THINGS, SUCH AS READING THE NEWSPAPER OR WATCHING TELEVISION: NOT AT ALL
SUM OF ALL RESPONSES TO PHQ QUESTIONS 1-9: 4
10. IF YOU CHECKED OFF ANY PROBLEMS, HOW DIFFICULT HAVE THESE PROBLEMS MADE IT FOR YOU TO DO YOUR WORK, TAKE CARE OF THINGS AT HOME, OR GET ALONG WITH OTHER PEOPLE: NOT DIFFICULT AT ALL
5. POOR APPETITE OR OVEREATING: NOT AT ALL
3. TROUBLE FALLING OR STAYING ASLEEP: NOT AT ALL
4. FEELING TIRED OR HAVING LITTLE ENERGY: NEARLY EVERY DAY

## 2024-08-16 NOTE — PROGRESS NOTES
SUBJECTIVE: Montrell Douglas is a 49 y.o. female who complains of urgency and dysuria x 7 days, without flank pain, fever, chills, or abnormal vaginal discharge or bleeding. The symptoms are intermittent.     OBJECTIVE: Appears well, in no apparent distress.  Vital signs are normal. The abdomen is soft without tenderness, guarding, mass, rebound or organomegaly. No CVA tenderness or inguinal adenopathy noted. Urine dipstick shows positive for leukocytes.      ASSESSMENT/Plan:     Diagnosis Orders   1. Urine frequency  POCT Urinalysis no Micro    Culture, Urine  Will start pyridium and culture urine. RX bactrim to hold until culture returns but if worsens over the weekend to start the antibiotic      2. Urinary pain  POCT Urinalysis no Micro    Culture, Urine

## 2024-08-18 LAB
BACTERIA UR CULT: ABNORMAL
ORGANISM: ABNORMAL

## 2024-08-19 LAB
BACTERIA UR CULT: ABNORMAL
ORGANISM: ABNORMAL

## 2024-08-21 ENCOUNTER — TELEPHONE (OUTPATIENT)
Dept: ORTHOPEDIC SURGERY | Age: 49
End: 2024-08-21

## 2024-08-21 RX ORDER — SULFAMETHOXAZOLE AND TRIMETHOPRIM 800; 160 MG/1; MG/1
1 TABLET ORAL 2 TIMES DAILY
Qty: 10 TABLET | Refills: 0 | Status: SHIPPED | OUTPATIENT
Start: 2024-08-21 | End: 2024-08-26

## 2024-09-02 DIAGNOSIS — R60.0 EDEMA OF LOWER EXTREMITY: ICD-10-CM

## 2024-09-02 DIAGNOSIS — I10 UNCONTROLLED HYPERTENSION: ICD-10-CM

## 2024-09-02 DIAGNOSIS — I10 PRIMARY HYPERTENSION: ICD-10-CM

## 2024-09-03 RX ORDER — HYDROCHLOROTHIAZIDE 25 MG/1
25 TABLET ORAL EVERY MORNING
Qty: 90 TABLET | Refills: 1 | Status: SHIPPED | OUTPATIENT
Start: 2024-09-03

## 2024-09-03 NOTE — TELEPHONE ENCOUNTER
Montrell Douglas [phone] (home)    is requesting refill(s) of medication Hydrochlorothiazide 25 mg Tablet to preferred pharmacy [facility]    Last OV 06/07/24 (pertaining to medication)   Last refill 03/11/24 (per medication requested)  Next office visit scheduled or attempted Yes  Lvm for pt to schedule 6 months follow up due on December.

## 2024-09-12 DIAGNOSIS — I10 HYPERTENSION, UNSPECIFIED TYPE: ICD-10-CM

## 2024-09-12 RX ORDER — LOSARTAN POTASSIUM 50 MG/1
TABLET ORAL
Qty: 90 TABLET | Refills: 1 | Status: SHIPPED | OUTPATIENT
Start: 2024-09-12

## 2024-12-18 NOTE — TELEPHONE ENCOUNTER
Montrell Douglas [phone] (home)    is requesting refill(s) of medication Esomeprazole 40 mg Dr Capsule to preferred pharmacy [facility]    Last OV 06/07/24 (pertaining to medication)   Last refill 01/26/24 (per medication requested)  Next office visit scheduled or attempted Yes  Date 01/31/25

## 2024-12-19 RX ORDER — ESOMEPRAZOLE MAGNESIUM 40 MG/1
40 CAPSULE, DELAYED RELEASE ORAL DAILY
Qty: 90 CAPSULE | Refills: 1 | Status: SHIPPED | OUTPATIENT
Start: 2024-12-19

## 2024-12-30 DIAGNOSIS — I10 HYPERTENSION, UNSPECIFIED TYPE: ICD-10-CM

## 2024-12-30 RX ORDER — METOPROLOL SUCCINATE 50 MG/1
50 TABLET, EXTENDED RELEASE ORAL DAILY
Qty: 90 TABLET | Refills: 1 | Status: SHIPPED | OUTPATIENT
Start: 2024-12-30

## 2024-12-30 NOTE — TELEPHONE ENCOUNTER
Montrell Douglas is requesting refill(s) metoprolol  Last OV 6/7/24 (pertaining to medication)  LR 5/7/24 (per medication requested)  Next office visit scheduled or attempted Yes   If no, reason:  1/31/24

## 2025-01-31 ENCOUNTER — OFFICE VISIT (OUTPATIENT)
Dept: FAMILY MEDICINE CLINIC | Age: 50
End: 2025-01-31
Payer: COMMERCIAL

## 2025-01-31 VITALS
DIASTOLIC BLOOD PRESSURE: 88 MMHG | OXYGEN SATURATION: 95 % | HEART RATE: 91 BPM | WEIGHT: 279.4 LBS | BODY MASS INDEX: 42.34 KG/M2 | RESPIRATION RATE: 16 BRPM | HEIGHT: 68 IN | SYSTOLIC BLOOD PRESSURE: 124 MMHG

## 2025-01-31 DIAGNOSIS — Z00.00 ANNUAL PHYSICAL EXAM: Primary | ICD-10-CM

## 2025-01-31 LAB
ALBUMIN SERPL-MCNC: 4.4 G/DL (ref 3.4–5)
ALBUMIN/GLOB SERPL: 1.6 {RATIO} (ref 1.1–2.2)
ALP SERPL-CCNC: 103 U/L (ref 40–129)
ALT SERPL-CCNC: 12 U/L (ref 10–40)
ANION GAP SERPL CALCULATED.3IONS-SCNC: 11 MMOL/L (ref 3–16)
AST SERPL-CCNC: 17 U/L (ref 15–37)
BILIRUB SERPL-MCNC: 0.3 MG/DL (ref 0–1)
BUN SERPL-MCNC: 16 MG/DL (ref 7–20)
CALCIUM SERPL-MCNC: 9.4 MG/DL (ref 8.3–10.6)
CHLORIDE SERPL-SCNC: 102 MMOL/L (ref 99–110)
CHOLEST SERPL-MCNC: 180 MG/DL (ref 0–199)
CO2 SERPL-SCNC: 26 MMOL/L (ref 21–32)
CREAT SERPL-MCNC: 0.6 MG/DL (ref 0.6–1.1)
GFR SERPLBLD CREATININE-BSD FMLA CKD-EPI: >90 ML/MIN/{1.73_M2}
GLUCOSE SERPL-MCNC: 104 MG/DL (ref 70–99)
HDLC SERPL-MCNC: 37 MG/DL (ref 40–60)
LDLC SERPL CALC-MCNC: 120 MG/DL
POTASSIUM SERPL-SCNC: 3.9 MMOL/L (ref 3.5–5.1)
PROT SERPL-MCNC: 7.1 G/DL (ref 6.4–8.2)
SODIUM SERPL-SCNC: 139 MMOL/L (ref 136–145)
TRIGL SERPL-MCNC: 115 MG/DL (ref 0–150)
VLDLC SERPL CALC-MCNC: 23 MG/DL

## 2025-01-31 PROCEDURE — 3079F DIAST BP 80-89 MM HG: CPT | Performed by: PHYSICIAN ASSISTANT

## 2025-01-31 PROCEDURE — 36415 COLL VENOUS BLD VENIPUNCTURE: CPT | Performed by: PHYSICIAN ASSISTANT

## 2025-01-31 PROCEDURE — 99396 PREV VISIT EST AGE 40-64: CPT | Performed by: PHYSICIAN ASSISTANT

## 2025-01-31 PROCEDURE — 3074F SYST BP LT 130 MM HG: CPT | Performed by: PHYSICIAN ASSISTANT

## 2025-01-31 RX ORDER — TRIAMCINOLONE ACETONIDE 0.25 MG/G
CREAM TOPICAL
Qty: 80 G | Refills: 0 | Status: SHIPPED | OUTPATIENT
Start: 2025-01-31

## 2025-01-31 RX ORDER — NEOMYCIN SULFATE, POLYMYXIN B SULFATE, AND DEXAMETHASONE 3.5; 10000; 1 MG/G; [USP'U]/G; MG/G
OINTMENT OPHTHALMIC 3 TIMES DAILY
COMMUNITY
Start: 2025-01-12

## 2025-01-31 RX ORDER — COVID-19 ANTIGEN TEST
KIT MISCELLANEOUS
COMMUNITY

## 2025-01-31 SDOH — ECONOMIC STABILITY: FOOD INSECURITY: WITHIN THE PAST 12 MONTHS, YOU WORRIED THAT YOUR FOOD WOULD RUN OUT BEFORE YOU GOT MONEY TO BUY MORE.: NEVER TRUE

## 2025-01-31 SDOH — ECONOMIC STABILITY: FOOD INSECURITY: WITHIN THE PAST 12 MONTHS, THE FOOD YOU BOUGHT JUST DIDN'T LAST AND YOU DIDN'T HAVE MONEY TO GET MORE.: NEVER TRUE

## 2025-01-31 ASSESSMENT — PATIENT HEALTH QUESTIONNAIRE - PHQ9
1. LITTLE INTEREST OR PLEASURE IN DOING THINGS: NOT AT ALL
2. FEELING DOWN, DEPRESSED OR HOPELESS: NOT AT ALL
SUM OF ALL RESPONSES TO PHQ9 QUESTIONS 1 & 2: 0
1. LITTLE INTEREST OR PLEASURE IN DOING THINGS: NOT AT ALL
SUM OF ALL RESPONSES TO PHQ QUESTIONS 1-9: 0
SUM OF ALL RESPONSES TO PHQ QUESTIONS 1-9: 0
2. FEELING DOWN, DEPRESSED OR HOPELESS: NOT AT ALL
SUM OF ALL RESPONSES TO PHQ QUESTIONS 1-9: 0
SUM OF ALL RESPONSES TO PHQ9 QUESTIONS 1 & 2: 0
SUM OF ALL RESPONSES TO PHQ QUESTIONS 1-9: 0

## 2025-01-31 NOTE — PROGRESS NOTES
History and Physical      Montrell Douglas  YOB: 1975    Date of Service:  1/31/2025    Chief Complaint:   Montrell Douglas is a 49 y.o. female who presents for complete physical examination.    HPI: Overall feeling.     Wt Readings from Last 3 Encounters:   01/31/25 126.7 kg (279 lb 6.4 oz)   08/16/24 128.3 kg (282 lb 12.8 oz)   06/07/24 125.4 kg (276 lb 6.4 oz)     BP Readings from Last 3 Encounters:   01/31/25 124/88   08/16/24 126/84   06/07/24 122/78       Patient Active Problem List   Diagnosis    Environmental allergies    GERD (gastroesophageal reflux disease)    Intrinsic eczema    Menorrhagia with regular cycle    HTN (hypertension)    Generalized arthritis    Migraine without aura and without status migrainosus, not intractable    Class 3 severe obesity due to excess calories with serious comorbidity and body mass index (BMI) of 40.0 to 44.9 in adult    Tachycardia    PVC (premature ventricular contraction)    Edema of lower extremity    Lumbar radiculopathy    Sacroiliitis, not elsewhere classified (HCC)       PREVENTIVE HEALTH:   Last eye exam:    yearly  Hearing concerns: No  Last Dental exam:    Every 6 Months  Caffeine use:  1-3/ day  Exercise:  no  Diet: feels needs improvement   Alcohol use: 0/ week  Tobacco/ Vapping/ marijuana use:  no  Drug use: no  Mental Health; concerns of anxiety or depression?  no.  PHQ-9 Total Score: 0 (1/31/2025  7:10 AM)     Perform routine skin checks? Yes  Perform monthly self breast exams?  Yes  Last Mammo:   past due will schedule  Last gyn exam:    past due- info given     Colonoscopy:  has called for appt  Advanced directives: no  Immunization History   Administered Date(s) Administered    COVID-19, MODERNA BLUE border, Primary or Immunocompromised, (age 12y+), IM, 100 mcg/0.5mL 02/11/2022, 03/11/2022    COVID-19, MODERNA Booster BLUE border, (age 18y+), IM, 50mcg/0.25mL 02/11/2022, 03/11/2022    Influenza Virus Vaccine 10/15/2018, 10/22/2019, 10/15/2020,

## 2025-02-01 LAB
EST. AVERAGE GLUCOSE BLD GHB EST-MCNC: 105.4 MG/DL
HBA1C MFR BLD: 5.3 %

## 2025-02-24 DIAGNOSIS — R60.0 EDEMA OF LOWER EXTREMITY: ICD-10-CM

## 2025-02-24 DIAGNOSIS — I10 UNCONTROLLED HYPERTENSION: ICD-10-CM

## 2025-02-24 DIAGNOSIS — I10 PRIMARY HYPERTENSION: ICD-10-CM

## 2025-02-24 RX ORDER — HYDROCHLOROTHIAZIDE 25 MG/1
25 TABLET ORAL EVERY MORNING
Qty: 90 TABLET | Refills: 1 | Status: SHIPPED | OUTPATIENT
Start: 2025-02-24

## 2025-02-24 NOTE — TELEPHONE ENCOUNTER
Montrell Douglas is requesting refill(s) hydrochlorothiazide  Last OV 1/31/25 (pertaining to medication)  LR 9/3/24 (per medication requested)  Next office visit scheduled or attempted No   If no, reason:  pt due 7/31/25

## 2025-03-05 DIAGNOSIS — I10 HYPERTENSION, UNSPECIFIED TYPE: ICD-10-CM

## 2025-03-05 RX ORDER — LOSARTAN POTASSIUM 50 MG/1
TABLET ORAL
Qty: 90 TABLET | Refills: 1 | Status: SHIPPED | OUTPATIENT
Start: 2025-03-05

## 2025-03-05 NOTE — TELEPHONE ENCOUNTER
Montrell Douglas is requesting refill(s) losartan  Last OV 1/31/25 (pertaining to medication)  LR 9/12/24 (per medication requested)  Next office visit scheduled or attempted No   If no, reason:  pt due 7/31/25

## 2025-03-18 NOTE — PROGRESS NOTES
Date and time of surgery :  3/25/25 @ 1400            Arrival Time:  1300     Bring Picture ID and insurance card.  Please wear simple, loose fitting clothing to the hospital.   Do not bring valuables (money, credit cards, checkbooks, etc.)   Do not wear any makeup (including  eye makeup) and no nail polish or artificial nails on your fingers or toes.  DO NOT wear any jewelry or piercings on day of surgery.  All body piercing jewelry must be removed.  If you have dentures, they will be removed before going to the OR; we will provide you a container.  If you wear contact lenses or glasses, they will be removed; please bring a case for them.  Shower the evening before or morning of surgery with antibacterial soap.  Nothing to eat or drink after midnight the day before surgery.   Clear liquids only the day before your procedure.   Start the 1st half of your prep at 1200 (according to MDs office instructions).  Be completely finished with 2nd half or prep 4 hours prior to procedure start time.  You may brush your teeth and gargle the morning of surgery.  DO NOT SWALLOW WATER.   Stop Cozaar 24 hours to procedure and hold hydrochlorothiazide the day of.  It is OK to take Metoprolol, Nexium, and Zyrtec the morning of your procedure.  Aspirin, Ibuprofen, Advil, Naproxen, Vitamin E and other Anti-inflammatory products and supplements should be stopped for 5 -7days before surgery or as directed by your physician.  Do not smoke or drink any alcoholic beverages 24 hours prior to surgery.  This includes NA Beer. Refrain from the usage of any recreational drugs, including non-prescribed prescription drugs.   You MUST plan for a responsible adult to stay on site while you are here and take you home after your surgery. You will not be allowed to leave alone or drive yourself home. It is strongly suggested someone stay with you the first 24 hrs. Your surgery will be cancelled if you do not have a ride home.  To help prevent

## 2025-03-18 NOTE — PROGRESS NOTES
Montrell Douglas    Age 49 y.o.    female    1975    JAVON [id number]    3/25/2025  Arrival Time_____________  OR Time____________30 Min     Procedure(s):  COLONOSCOPY                      Monitor Anesthesia Care    Surgeon(s):  Osito Busby, MD       Phone [phone] (home)     Initals  Date  Info Source  Home  Cell         Work  _____________________________________________________________________  _____________________________________________________________________  _____________________________________________________________________  _____________________________________________________________________  _____________________________________________________________________    PCP _____________________________ Phone_________________     H&P  ________________  Bringing      Chart              Epic      DOS      Called________  EKG ________________   Bringing      Chart              Epic      DOS      Called________  LABS________________   Bringing     Chart              Epic      DOS      Called________  Cardiac Clearance ______ Bringing      Chart              Epic      DOS      Called________  Pulmonary Clearance____ Bringing      Chart              Epic      DOS      Called________    Cardiologist________________________ Phone___________________________  Pulmonologist_______________________Phone___________________________    ? Advance Directives   ? Anglican concerns / Waiver on Chart            PAT Communications________________  ? Pre-op Instructions Given /Understood          _________________________________  ? Directions to Surgery Center                          _________________________________  ? Transportation Home_______________      __________________________________  ? Crutches/Walker__________________        __________________________________    Orders: Hard copy/ EPIC                 Transcribed/ EPIC              _______Wt.    ________Pharmacy                         _______SCD

## 2025-03-24 ENCOUNTER — ANESTHESIA EVENT (OUTPATIENT)
Dept: ENDOSCOPY | Age: 50
End: 2025-03-24
Payer: COMMERCIAL

## 2025-03-25 ENCOUNTER — HOSPITAL ENCOUNTER (OUTPATIENT)
Age: 50
Setting detail: OUTPATIENT SURGERY
Discharge: HOME | End: 2025-03-25
Attending: INTERNAL MEDICINE | Admitting: INTERNAL MEDICINE
Payer: COMMERCIAL

## 2025-03-25 ENCOUNTER — ANESTHESIA (OUTPATIENT)
Dept: ENDOSCOPY | Age: 50
End: 2025-03-25
Payer: COMMERCIAL

## 2025-03-25 VITALS
HEIGHT: 69 IN | DIASTOLIC BLOOD PRESSURE: 87 MMHG | SYSTOLIC BLOOD PRESSURE: 160 MMHG | RESPIRATION RATE: 16 BRPM | WEIGHT: 275 LBS | OXYGEN SATURATION: 100 % | TEMPERATURE: 97.9 F | HEART RATE: 89 BPM | BODY MASS INDEX: 40.73 KG/M2

## 2025-03-25 LAB — HCG UR QL: NEGATIVE

## 2025-03-25 PROCEDURE — 6360000002 HC RX W HCPCS: Performed by: NURSE ANESTHETIST, CERTIFIED REGISTERED

## 2025-03-25 PROCEDURE — 3609027000 HC COLONOSCOPY: Performed by: INTERNAL MEDICINE

## 2025-03-25 PROCEDURE — 7100000011 HC PHASE II RECOVERY - ADDTL 15 MIN: Performed by: INTERNAL MEDICINE

## 2025-03-25 PROCEDURE — 2709999900 HC NON-CHARGEABLE SUPPLY: Performed by: INTERNAL MEDICINE

## 2025-03-25 PROCEDURE — 7100000010 HC PHASE II RECOVERY - FIRST 15 MIN: Performed by: INTERNAL MEDICINE

## 2025-03-25 PROCEDURE — 84703 CHORIONIC GONADOTROPIN ASSAY: CPT

## 2025-03-25 PROCEDURE — 3700000000 HC ANESTHESIA ATTENDED CARE: Performed by: INTERNAL MEDICINE

## 2025-03-25 PROCEDURE — 3700000001 HC ADD 15 MINUTES (ANESTHESIA): Performed by: INTERNAL MEDICINE

## 2025-03-25 RX ORDER — SODIUM CHLORIDE 0.9 % (FLUSH) 0.9 %
5-40 SYRINGE (ML) INJECTION PRN
Status: DISCONTINUED | OUTPATIENT
Start: 2025-03-25 | End: 2025-03-25 | Stop reason: HOSPADM

## 2025-03-25 RX ORDER — LIDOCAINE HYDROCHLORIDE 10 MG/ML
1 INJECTION, SOLUTION EPIDURAL; INFILTRATION; INTRACAUDAL; PERINEURAL
Status: DISCONTINUED | OUTPATIENT
Start: 2025-03-25 | End: 2025-03-25 | Stop reason: HOSPADM

## 2025-03-25 RX ORDER — SODIUM CHLORIDE, SODIUM LACTATE, POTASSIUM CHLORIDE, CALCIUM CHLORIDE 600; 310; 30; 20 MG/100ML; MG/100ML; MG/100ML; MG/100ML
INJECTION, SOLUTION INTRAVENOUS CONTINUOUS
Status: DISCONTINUED | OUTPATIENT
Start: 2025-03-25 | End: 2025-03-25 | Stop reason: HOSPADM

## 2025-03-25 RX ORDER — SODIUM CHLORIDE 0.9 % (FLUSH) 0.9 %
5-40 SYRINGE (ML) INJECTION EVERY 12 HOURS SCHEDULED
Status: DISCONTINUED | OUTPATIENT
Start: 2025-03-25 | End: 2025-03-25 | Stop reason: HOSPADM

## 2025-03-25 RX ORDER — SODIUM CHLORIDE 9 MG/ML
INJECTION, SOLUTION INTRAVENOUS PRN
Status: DISCONTINUED | OUTPATIENT
Start: 2025-03-25 | End: 2025-03-25 | Stop reason: HOSPADM

## 2025-03-25 RX ORDER — MIDAZOLAM HYDROCHLORIDE 1 MG/ML
2 INJECTION, SOLUTION INTRAMUSCULAR; INTRAVENOUS
Status: DISCONTINUED | OUTPATIENT
Start: 2025-03-25 | End: 2025-03-25 | Stop reason: HOSPADM

## 2025-03-25 RX ORDER — LIDOCAINE HYDROCHLORIDE 20 MG/ML
INJECTION, SOLUTION INFILTRATION; PERINEURAL
Status: DISCONTINUED | OUTPATIENT
Start: 2025-03-25 | End: 2025-03-25 | Stop reason: SDUPTHER

## 2025-03-25 RX ORDER — PROPOFOL 10 MG/ML
INJECTION, EMULSION INTRAVENOUS
Status: DISCONTINUED | OUTPATIENT
Start: 2025-03-25 | End: 2025-03-25 | Stop reason: SDUPTHER

## 2025-03-25 RX ADMIN — PROPOFOL 150 MG: 10 INJECTION, EMULSION INTRAVENOUS at 14:05

## 2025-03-25 RX ADMIN — LIDOCAINE HYDROCHLORIDE 100 MG: 20 INJECTION, SOLUTION INFILTRATION; PERINEURAL at 14:05

## 2025-03-25 RX ADMIN — PROPOFOL 150 MCG/KG/MIN: 10 INJECTION, EMULSION INTRAVENOUS at 14:06

## 2025-03-25 ASSESSMENT — PAIN SCALES - GENERAL
PAINLEVEL_OUTOF10: 0

## 2025-03-25 ASSESSMENT — PAIN - FUNCTIONAL ASSESSMENT: PAIN_FUNCTIONAL_ASSESSMENT: 0-10

## 2025-03-25 NOTE — ANESTHESIA PRE PROCEDURE
Department of Anesthesiology  Preprocedure Note       Name:  Montrell Douglas   Age:  49 y.o.  :  1975                                          MRN:  [id number]         Date:  3/25/2025      Surgeon: Surgeon(s):  Osito Busby MD    Procedure: Procedure(s):  COLONOSCOPY    Medications prior to admission:   Prior to Admission medications    Medication Sig Start Date End Date Taking? Authorizing Provider   losartan (COZAAR) 50 MG tablet TAKE ONE TABLET BY MOUTH ONCE A DAY 3/5/25   Rhianna Rosa PA   hydroCHLOROthiazide (HYDRODIURIL) 25 MG tablet TAKE ONE TABLET BY MOUTH EVERY MORNING 25   Rhianna Rosa PA   Naproxen Sodium (ALEVE) 220 MG CAPS Take by mouth    Mau Malloy MD   neomycin-polymyxin-dexameth 3.5-41127-6.1 OINT Place into both eyes 3 times daily 25   Mau Malloy MD   triamcinolone (KENALOG) 0.025 % cream Apply topically 2 times daily. 25   Rhianna Rosa PA   metoprolol succinate (TOPROL XL) 50 MG extended release tablet TAKE ONE TABLET BY MOUTH ONCE A DAY 24   Rhianna Rosa PA   esomeprazole (NEXIUM) 40 MG delayed release capsule TAKE ONE CAPSULE BY MOUTH ONCE A DAY 24   Rhianna Rosa PA   fluticasone (VERAMYST) 27.5 MCG/SPRAY nasal spray 2 sprays by Each Nostril route daily    Mau Malloy MD   cetirizine (ZYRTEC) 10 MG tablet Take 1 tablet by mouth    Mau Malloy MD       Current medications:    Current Facility-Administered Medications   Medication Dose Route Frequency Provider Last Rate Last Admin   • lidocaine PF 1 % injection 1 mL  1 mL IntraDERmal Once PRN Sushil Mercado MD       • lactated ringers infusion   IntraVENous Continuous Sushil Mercado MD       • sodium chloride flush 0.9 % injection 5-40 mL  5-40 mL IntraVENous 2 times per day Sushil Mercado MD       • sodium chloride flush 0.9 % injection 5-40 mL  5-40 mL IntraVENous PRN Sushil Mercado MD       • 0.9 % sodium chloride infusion   IntraVENous

## 2025-03-25 NOTE — OP NOTE
Colonoscopy Note    Patient:   Montrell Douglas    YOB: 1975    [place]     [facility] [place]  [address]  [facility] [address]   [Inpatient]  Referring/PCP: Rhianna Rosa PA  Procedure:   Colonoscopy --screening  Date:     3/25/2025  Endoscopist:  Osito Busby MD, DO    Preoperative Diagnosis:  screening for colon cancer.    Postoperative Diagnosis:  normal    Anesthesia: Propofol per anesthesia    Estimated blood loss: < 5 ml    Complications:  None    Description of Procedure:  Informed consent was obtained from the patient after explanation of the procedure including indications, description of the procedure,  benefits and possible risks and complications of the procedure, and alternatives. Questions were answered.  The patient's history was reviewed and a directed physical examination was performed prior to the procedure.    Patient was monitored throughout the procedure with pulse oximetry and periodic assessment of vital signs. Patient was sedated as noted above. The Nursing staff and I performed a time out.  With the patient initially in the left lateral decubitus position, a digital rectal examination was performed and revealed negative without mass, lesions or tenderness.  The Olympus video colonoscope was placed in the patient's rectum and advanced without difficulty  to the cecum, which was identified by the ileocecal valve and appendiceal orifice. Photographs were taken.   The prep was good.  Examination of the mucosa was performed during both introduction and withdrawal of the colonoscope. Retroflexed view of the rectum was performed.      Findings:   The entire examined colon appeared normal. Retroflexion was normal.      Recommendations:  Repeat colonoscopy in 10 years.    Osito Busby MD    [place] [facility]   [address]    [address]      [place] [address]     Phone: [phone]     Fax: [phone]

## 2025-03-25 NOTE — ANESTHESIA POSTPROCEDURE EVALUATION
Department of Anesthesiology  Postprocedure Note    Patient: Montrell Douglas  MRN: [id number]  YOB: 1975  Date of evaluation: 3/25/2025    Procedure Summary       Date: 03/25/25 Room / Location: [place] [address] / [facility]    Anesthesia Start: 1400 Anesthesia Stop: 1423    Procedure: COLONOSCOPY Diagnosis:       Screening for colon cancer      (Screening for colon cancer [Z12.11])    Surgeons: Osito Busby MD Responsible Provider: Kennedy Jensen MD    Anesthesia Type: MAC ASA Status: 3            Anesthesia Type: No value filed.    Barbara Phase I: Barbara Score: 10    Barbara Phase II: Barbara Score: 9    Anesthesia Post Evaluation    Patient location during evaluation: PACU  Patient participation: complete - patient participated  Level of consciousness: awake and alert  Airway patency: patent  Nausea & Vomiting: no nausea and no vomiting  Cardiovascular status: blood pressure returned to baseline  Respiratory status: acceptable  Hydration status: euvolemic  Comments: VSS on transfer to phase 2 recovery.  No anesthetic complications.  Pain management: adequate    No notable events documented.

## 2025-03-25 NOTE — DISCHARGE INSTRUCTIONS
you otherwise. Drink plenty of water.    CALL YOUR DOCTOR IF YOU:  Have moderate to severe nausea or vomiting AND are unable to hold down fluids or prescribed medications.  Have bright red bloody drainage from your dressing that won't stop oozing.  Do not get relief with your pain medication    NORMAL (POSSIBLE) SIDE EFFECTS FROM ANESTHESIA:     Confusion, temporary memory loss, delayed reaction times in the first 24 hours  Lightheadedness, dizziness, difficulty focusing, blurred vision  Nausea/vomiting can happen  Shivering, feeling cold, sore throat, cough and muscle aches should stop within 24-48 hours  Trouble urinating - call your surgeon if it has been more than 8 hrs  Bruising or soreness at the IV site - call if it remains red, firm or there is drainage             FEMALES OF CHILDBEARING AGE WHO ARE TAKING BIRTH CONTROL PILLS:  You may have received a medication during your procedure that interferes with the   actions of birth control pills (Bridion or Emend). Use some other kind of birth control in addition to your pills, like a condom, for 1 month after your procedure to prevent unwanted pregnancy.    The following instructions are to be followed if you have a known history or diagnosis of sleep apnea:  For all sleep apnea patients:  ? Sleep on your side or sitting up in a chair whenever possible, especially the first 24 hours after surgery.  ? Use only medicines prescribed by your doctor.    ? Do not drink alcohol.  ? If you have a dental device to assist you while at rest, use it at all times for the first 24 hours.  For patients using CPAP machines:  ? Use your CPAP machine during all periods of sleep as usual.  ? Use your CPAP machine during all periods of daytime rest while on pain medicines.  ** Follow up with your primary care doctor for continued care.    IF YOU DO NOT TAKE ALL OF YOUR NARCOTIC PAIN MEDICATION, please dispose of them responsibly. There are drop off boxes in the Emergency

## 2025-03-25 NOTE — PROGRESS NOTES
Patient admitted to pre-op in preparation for surgery, VSS. Consent confirmed. IV inserted into right AC, NS infusing. Belongings under stretcher. NPO since 0800. Family at bedside, phone number in system for text updates, call light within reach.

## 2025-03-25 NOTE — H&P
Gastroenterology Preop Assessment    Patient:   Montrell Douglas   :    1975   Facility:   [facility]  Referring/PCP: Rhianna Rosa PA  Date:     3/25/2025    Subjective:   Procedure: Colonoscopy    HPI/Reason for procedure:  Screening for colon cancer    Past Medical History:   Diagnosis Date    Abnormal Pap smear of cervix     Leep     Allergic rhinitis     Childhood    Anemia     child     Anxiety     Asthma     child martines     BPPV (benign paroxysmal positional vertigo)     Carpal tunnel syndrome     Chronic pain     FHx: hemochromatosis 2019    GERD (gastroesophageal reflux disease)     History of blood transfusion     5 yrs of age     HTN (hypertension)     Irritable bowel syndrome     Since I was 20    IUD migration, initial encounter 2015    Migraines     Obesity     Osteoarthritis     Pap smear abnormality of cervix with ASCUS favoring benign 2013    Overview:  ASCUS HPV negative-co test in 3 years ()    PVC (premature ventricular contraction)     Radiculopathy     Stress incontinence      Past Surgical History:   Procedure Laterality Date    CHOLECYSTECTOMY, LAPAROSCOPIC      LEEP      PAIN MANAGEMENT PROCEDURE Right 2022    RIGHT LUMBAR FIVE SACRAL ONE EPIDURAL STEROID INJECTION SITE CONFIRMED BY FLUOROSCOPY performed by José Fuentes MD at [facility] OR    SHOULDER ARTHROSCOPY      Capsular shift    SHOULDER SURGERY Left     SHOULDER SURGERY Right     TONSILLECTOMY AND ADENOIDECTOMY         Social:   Social History     Tobacco Use    Smoking status: Former     Current packs/day: 0.00     Average packs/day: 0.5 packs/day for 10.6 years (5.3 ttl pk-yrs)     Types: Cigarettes     Start date: 1995     Quit date: 2005     Years since quittin.6    Smokeless tobacco: Never   Substance Use Topics    Alcohol use: Yes     Comment: 2 drinks a month     Family:   Family History   Problem Relation Age of Onset    Diabetes

## 2025-03-28 ENCOUNTER — OFFICE VISIT (OUTPATIENT)
Dept: OBGYN CLINIC | Age: 50
End: 2025-03-28

## 2025-03-28 VITALS
DIASTOLIC BLOOD PRESSURE: 88 MMHG | BODY MASS INDEX: 41.91 KG/M2 | SYSTOLIC BLOOD PRESSURE: 155 MMHG | WEIGHT: 283.8 LBS | HEART RATE: 105 BPM

## 2025-03-28 DIAGNOSIS — Z12.31 ENCOUNTER FOR SCREENING MAMMOGRAM FOR MALIGNANT NEOPLASM OF BREAST: ICD-10-CM

## 2025-03-28 DIAGNOSIS — Z12.4 PAP SMEAR FOR CERVICAL CANCER SCREENING: ICD-10-CM

## 2025-03-28 DIAGNOSIS — Z01.419 ENCNTR FOR GYN EXAM (GENERAL) (ROUTINE) W/O ABN FINDINGS: Primary | ICD-10-CM

## 2025-03-28 RX ORDER — COLESEVELAM 180 1/1
TABLET ORAL
COMMUNITY
Start: 2025-02-10

## 2025-03-28 NOTE — PROGRESS NOTES
Psychiatric/Behavioral:  Negative for agitation.    Breast: Denies skin changes, nipple discharge, lesions, dimpling, tenderness or palpable masses     Primary Care Physician: Rhianna Rosa PA    Obstetric History  OB History    Para Term  AB Living   1 1 1   1   SAB IAB Ectopic Molar Multiple Live Births        1      # Outcome Date GA Lbr Hernando/2nd Weight Sex Type Anes PTL Lv   1 Term 06   2.92 kg (6 lb 7 oz) F Vag-Spont EPI N DAPHNE      Complications: Pre-eclampsia in third trimester       Gynecologic History  Menstrual History:  LMP: Patient's last menstrual period was 2025.   Age of Menarche: 12-15 y/o  Menstrual Period: regular  Interval Between Menses: Monthly   Duration of Menses: 6 days  Menstrual Flow: Heavy- light  Bleeding between menses: Denies    Sexual History:  Contraception: see above  Currently is sexually active  10 Lifetime partners  Denies history of STIs  Denies sexual problems    Pap History:  History of abnormal pap smears: see above  Last pap: see above      Medical History:  Past Medical History:   Diagnosis Date    Abnormal Pap smear of cervix     Leep     Allergic rhinitis     Childhood    Anemia     child     Anxiety     Asthma     child martines     BPPV (benign paroxysmal positional vertigo)     Carpal tunnel syndrome     Chronic pain     FHx: hemochromatosis 2019    GERD (gastroesophageal reflux disease)     History of blood transfusion     5 yrs of age     HTN (hypertension)     Irritable bowel syndrome     Since I was 20    IUD migration, initial encounter 2015    Migraines     Obesity     Osteoarthritis     Pap smear abnormality of cervix with ASCUS favoring benign 2013    Overview:  ASCUS HPV negative-co test in 3 years (2016)    PVC (premature ventricular contraction)     Radiculopathy     Stress incontinence        Medications:  Current Outpatient Medications   Medication Sig Dispense Refill    colesevelam (WELCHOL) 625 MG tablet

## 2025-04-01 LAB
HPV HR 12 DNA SPEC QL NAA+PROBE: NOT DETECTED
HPV16 DNA SPEC QL NAA+PROBE: NOT DETECTED
HPV16+18+H RISK 12 DNA SPEC-IMP: NORMAL
HPV18 DNA SPEC QL NAA+PROBE: NOT DETECTED

## 2025-04-02 ENCOUNTER — RESULTS FOLLOW-UP (OUTPATIENT)
Dept: OBGYN CLINIC | Age: 50
End: 2025-04-02

## 2025-06-10 RX ORDER — ESOMEPRAZOLE MAGNESIUM 40 MG/1
40 CAPSULE, DELAYED RELEASE ORAL DAILY
Qty: 90 CAPSULE | Refills: 1 | Status: SHIPPED | OUTPATIENT
Start: 2025-06-10

## 2025-06-10 NOTE — TELEPHONE ENCOUNTER
Montrell Douglas is requesting refill(s) omeprazole  Last OV 1/31/25 (pertaining to medication)  LR 12/19/24 (per medication requested)  Next office visit scheduled or attempted No   If no, reason:  do you want the pt back in one year or 6 months?

## 2025-06-24 DIAGNOSIS — I10 HYPERTENSION, UNSPECIFIED TYPE: ICD-10-CM

## 2025-06-24 RX ORDER — METOPROLOL SUCCINATE 50 MG/1
50 TABLET, EXTENDED RELEASE ORAL DAILY
Qty: 90 TABLET | Refills: 1 | Status: SHIPPED | OUTPATIENT
Start: 2025-06-24

## 2025-06-24 NOTE — TELEPHONE ENCOUNTER
Montrell Douglas is requesting refill(s) metoprolol  Last OV 1/31/25 (pertaining to medication)  LR 12/30/24 (per medication requested)  Next office visit scheduled or attempted Yes   If no, reason:  LVM for pt to call back, due 7/31/25

## 2025-08-26 DIAGNOSIS — I10 HYPERTENSION, UNSPECIFIED TYPE: ICD-10-CM

## 2025-08-26 RX ORDER — LOSARTAN POTASSIUM 50 MG/1
50 TABLET ORAL DAILY
Qty: 90 TABLET | Refills: 0 | Status: SHIPPED | OUTPATIENT
Start: 2025-08-26

## 2025-09-01 DIAGNOSIS — I10 PRIMARY HYPERTENSION: ICD-10-CM

## 2025-09-01 DIAGNOSIS — I10 UNCONTROLLED HYPERTENSION: ICD-10-CM

## 2025-09-01 DIAGNOSIS — R60.0 EDEMA OF LOWER EXTREMITY: ICD-10-CM

## 2025-09-02 RX ORDER — HYDROCHLOROTHIAZIDE 25 MG/1
25 TABLET ORAL EVERY MORNING
Qty: 90 TABLET | Refills: 0 | Status: SHIPPED | OUTPATIENT
Start: 2025-09-02

## (undated) DEVICE — ALCOHOL RUBBING 16OZ 70% ISO

## (undated) DEVICE — APPLICATOR PREP 26ML 0.7% IOD POVACRYLEX 74% ISO ALC ST

## (undated) DEVICE — STERILE POLYISOPRENE POWDER-FREE SURGICAL GLOVES: Brand: PROTEXIS

## (undated) DEVICE — UNIVERSAL BLOCK TRAY: Brand: MEDLINE INDUSTRIES, INC.

## (undated) DEVICE — GAUZE,SPONGE,4"X4",16PLY,STRL,LF,10/TRAY: Brand: MEDLINE

## (undated) DEVICE — TOWEL OR BLUEE 16X26IN ST 8 PACK ORB08 16X26ORTWL

## (undated) DEVICE — CANNULA NSL AD L7FT CLR VYN CRV PRNG NONFLARED TIP STD CONN

## (undated) DEVICE — ELECTRODE ECG MONITR FOAM TEAR DROP ADLT RED

## (undated) DEVICE — ENDOSCOPIC KIT 2 12 FT OP4 DE2 GWN SYR